# Patient Record
Sex: FEMALE | Race: WHITE | NOT HISPANIC OR LATINO | Employment: OTHER | URBAN - METROPOLITAN AREA
[De-identification: names, ages, dates, MRNs, and addresses within clinical notes are randomized per-mention and may not be internally consistent; named-entity substitution may affect disease eponyms.]

---

## 2017-12-31 ENCOUNTER — HOSPITAL ENCOUNTER (INPATIENT)
Facility: HOSPITAL | Age: 72
LOS: 5 days | Discharge: NURSING, REHAB OR LONG-TERM CARE | End: 2018-01-05
Attending: INTERNAL MEDICINE | Admitting: INTERNAL MEDICINE

## 2017-12-31 DIAGNOSIS — Z74.09 IMPAIRED FUNCTIONAL MOBILITY, BALANCE, GAIT, AND ENDURANCE: Primary | ICD-10-CM

## 2017-12-31 DIAGNOSIS — E87.6 HYPOKALEMIA: ICD-10-CM

## 2017-12-31 DIAGNOSIS — Z78.9 IMPAIRED MOBILITY AND ADLS: ICD-10-CM

## 2017-12-31 DIAGNOSIS — Z74.09 IMPAIRED MOBILITY AND ADLS: ICD-10-CM

## 2017-12-31 PROBLEM — J90 RECURRENT RIGHT PLEURAL EFFUSION: Status: ACTIVE | Noted: 2017-12-31

## 2017-12-31 PROBLEM — I10 HYPERTENSION: Status: ACTIVE | Noted: 2017-12-31

## 2017-12-31 PROBLEM — J90 RECURRENT LEFT PLEURAL EFFUSION: Status: ACTIVE | Noted: 2017-12-31

## 2017-12-31 PROBLEM — F10.11 HISTORY OF ALCOHOL ABUSE: Status: ACTIVE | Noted: 2017-12-31

## 2017-12-31 PROBLEM — E78.5 HYPERLIPIDEMIA: Status: ACTIVE | Noted: 2017-12-31

## 2017-12-31 PROBLEM — Z99.81 SUPPLEMENTAL OXYGEN DEPENDENT: Status: ACTIVE | Noted: 2017-12-31

## 2017-12-31 PROBLEM — Z91.199 MEDICAL NON-COMPLIANCE: Status: ACTIVE | Noted: 2017-12-31

## 2017-12-31 PROBLEM — F41.8 DEPRESSION WITH ANXIETY: Status: ACTIVE | Noted: 2017-12-31

## 2017-12-31 PROBLEM — R53.81 DEBILITY: Status: ACTIVE | Noted: 2017-12-31

## 2017-12-31 PROBLEM — J44.9 END STAGE COPD (HCC): Status: ACTIVE | Noted: 2017-12-31

## 2017-12-31 PROBLEM — I48.91 ATRIAL FIBRILLATION (HCC): Status: ACTIVE | Noted: 2017-12-31

## 2017-12-31 PROBLEM — J90 PLEURAL EFFUSION: Status: ACTIVE | Noted: 2017-12-31

## 2017-12-31 PROBLEM — Z87.891 HISTORY OF TOBACCO ABUSE: Status: ACTIVE | Noted: 2017-12-31

## 2017-12-31 LAB
ALBUMIN SERPL-MCNC: 2.6 G/DL (ref 3.2–4.8)
ALBUMIN/GLOB SERPL: 1.2 G/DL (ref 1.5–2.5)
ALP SERPL-CCNC: 85 U/L (ref 25–100)
ALT SERPL W P-5'-P-CCNC: 38 U/L (ref 7–40)
ANION GAP SERPL CALCULATED.3IONS-SCNC: 9 MMOL/L (ref 3–11)
AST SERPL-CCNC: 28 U/L (ref 0–33)
BASOPHILS # BLD AUTO: 0.01 10*3/MM3 (ref 0–0.2)
BASOPHILS NFR BLD AUTO: 0.1 % (ref 0–1)
BILIRUB SERPL-MCNC: 0.2 MG/DL (ref 0.3–1.2)
BUN BLD-MCNC: 15 MG/DL (ref 9–23)
BUN/CREAT SERPL: 21.4 (ref 7–25)
CALCIUM SPEC-SCNC: 7.9 MG/DL (ref 8.7–10.4)
CHLORIDE SERPL-SCNC: 96 MMOL/L (ref 99–109)
CO2 SERPL-SCNC: 30 MMOL/L (ref 20–31)
CREAT BLD-MCNC: 0.7 MG/DL (ref 0.6–1.3)
DEPRECATED RDW RBC AUTO: 48 FL (ref 37–54)
EOSINOPHIL # BLD AUTO: 0 10*3/MM3 (ref 0–0.3)
EOSINOPHIL NFR BLD AUTO: 0 % (ref 0–3)
ERYTHROCYTE [DISTWIDTH] IN BLOOD BY AUTOMATED COUNT: 13.8 % (ref 11.3–14.5)
GFR SERPL CREATININE-BSD FRML MDRD: 82 ML/MIN/1.73
GLOBULIN UR ELPH-MCNC: 2.1 GM/DL
GLUCOSE BLD-MCNC: 107 MG/DL (ref 70–100)
HCT VFR BLD AUTO: 30.1 % (ref 34.5–44)
HGB BLD-MCNC: 9.9 G/DL (ref 11.5–15.5)
IMM GRANULOCYTES # BLD: 0.12 10*3/MM3 (ref 0–0.03)
IMM GRANULOCYTES NFR BLD: 1.3 % (ref 0–0.6)
LYMPHOCYTES # BLD AUTO: 0.65 10*3/MM3 (ref 0.6–4.8)
LYMPHOCYTES NFR BLD AUTO: 6.8 % (ref 24–44)
MCH RBC QN AUTO: 31.3 PG (ref 27–31)
MCHC RBC AUTO-ENTMCNC: 32.9 G/DL (ref 32–36)
MCV RBC AUTO: 95.3 FL (ref 80–99)
MONOCYTES # BLD AUTO: 0.63 10*3/MM3 (ref 0–1)
MONOCYTES NFR BLD AUTO: 6.6 % (ref 0–12)
NEUTROPHILS # BLD AUTO: 8.11 10*3/MM3 (ref 1.5–8.3)
NEUTROPHILS NFR BLD AUTO: 85.2 % (ref 41–71)
PLATELET # BLD AUTO: 286 10*3/MM3 (ref 150–450)
PMV BLD AUTO: 8.9 FL (ref 6–12)
POTASSIUM BLD-SCNC: 4 MMOL/L (ref 3.5–5.5)
PROT SERPL-MCNC: 4.7 G/DL (ref 5.7–8.2)
RBC # BLD AUTO: 3.16 10*6/MM3 (ref 3.89–5.14)
SODIUM BLD-SCNC: 135 MMOL/L (ref 132–146)
WBC NRBC COR # BLD: 9.52 10*3/MM3 (ref 3.5–10.8)

## 2017-12-31 PROCEDURE — 85025 COMPLETE CBC W/AUTO DIFF WBC: CPT | Performed by: NURSE PRACTITIONER

## 2017-12-31 PROCEDURE — 80053 COMPREHEN METABOLIC PANEL: CPT | Performed by: NURSE PRACTITIONER

## 2017-12-31 PROCEDURE — 93005 ELECTROCARDIOGRAM TRACING: CPT | Performed by: NURSE PRACTITIONER

## 2017-12-31 PROCEDURE — 99223 1ST HOSP IP/OBS HIGH 75: CPT | Performed by: INTERNAL MEDICINE

## 2017-12-31 RX ORDER — GUAIFENESIN 600 MG/1
600 TABLET, EXTENDED RELEASE ORAL EVERY 12 HOURS SCHEDULED
Status: DISCONTINUED | OUTPATIENT
Start: 2018-01-01 | End: 2018-01-05 | Stop reason: HOSPADM

## 2017-12-31 RX ORDER — IPRATROPIUM BROMIDE AND ALBUTEROL SULFATE 2.5; .5 MG/3ML; MG/3ML
3 SOLUTION RESPIRATORY (INHALATION) EVERY 4 HOURS PRN
Status: DISCONTINUED | OUTPATIENT
Start: 2017-12-31 | End: 2018-01-05 | Stop reason: HOSPADM

## 2017-12-31 RX ORDER — PAROXETINE HYDROCHLORIDE 20 MG/1
20 TABLET, FILM COATED ORAL EVERY MORNING
COMMUNITY

## 2017-12-31 RX ORDER — THIAMINE MONONITRATE (VIT B1) 100 MG
100 TABLET ORAL DAILY
COMMUNITY

## 2017-12-31 RX ORDER — PANTOPRAZOLE SODIUM 40 MG/1
40 TABLET, DELAYED RELEASE ORAL 2 TIMES DAILY
COMMUNITY

## 2017-12-31 RX ORDER — PANTOPRAZOLE SODIUM 40 MG/1
40 TABLET, DELAYED RELEASE ORAL
Status: DISCONTINUED | OUTPATIENT
Start: 2018-01-01 | End: 2018-01-05 | Stop reason: HOSPADM

## 2017-12-31 RX ORDER — THIAMINE MONONITRATE (VIT B1) 100 MG
100 TABLET ORAL DAILY
Status: DISCONTINUED | OUTPATIENT
Start: 2018-01-01 | End: 2018-01-05 | Stop reason: HOSPADM

## 2017-12-31 RX ORDER — FOLIC ACID 1 MG/1
1 TABLET ORAL DAILY
COMMUNITY

## 2017-12-31 RX ORDER — DIGOXIN 125 MCG
125 TABLET ORAL
COMMUNITY

## 2017-12-31 RX ORDER — ALPRAZOLAM 0.25 MG/1
0.25 TABLET ORAL DAILY PRN
COMMUNITY
End: 2018-01-05 | Stop reason: HOSPADM

## 2017-12-31 RX ORDER — ASPIRIN 81 MG/1
81 TABLET, CHEWABLE ORAL DAILY
COMMUNITY

## 2017-12-31 RX ORDER — ACETAMINOPHEN 325 MG/1
650 TABLET ORAL EVERY 6 HOURS PRN
Status: DISCONTINUED | OUTPATIENT
Start: 2017-12-31 | End: 2018-01-05 | Stop reason: HOSPADM

## 2017-12-31 RX ORDER — AMIODARONE HYDROCHLORIDE 200 MG/1
200 TABLET ORAL 2 TIMES DAILY
Status: DISCONTINUED | OUTPATIENT
Start: 2017-12-31 | End: 2018-01-05 | Stop reason: HOSPADM

## 2017-12-31 RX ORDER — ASPIRIN 81 MG/1
81 TABLET, CHEWABLE ORAL DAILY
Status: DISCONTINUED | OUTPATIENT
Start: 2018-01-01 | End: 2018-01-05 | Stop reason: HOSPADM

## 2017-12-31 RX ORDER — GUAIFENESIN 600 MG/1
600 TABLET, EXTENDED RELEASE ORAL 2 TIMES DAILY
COMMUNITY

## 2017-12-31 RX ORDER — SODIUM CHLORIDE 0.9 % (FLUSH) 0.9 %
1-10 SYRINGE (ML) INJECTION AS NEEDED
Status: DISCONTINUED | OUTPATIENT
Start: 2017-12-31 | End: 2018-01-05 | Stop reason: HOSPADM

## 2017-12-31 RX ORDER — FOLIC ACID 1 MG/1
1 TABLET ORAL DAILY
Status: DISCONTINUED | OUTPATIENT
Start: 2018-01-01 | End: 2018-01-05 | Stop reason: HOSPADM

## 2017-12-31 RX ORDER — LISINOPRIL 2.5 MG/1
2.5 TABLET ORAL DAILY
Status: DISCONTINUED | OUTPATIENT
Start: 2018-01-01 | End: 2018-01-02

## 2017-12-31 RX ORDER — AMIODARONE HYDROCHLORIDE 200 MG/1
200 TABLET ORAL 2 TIMES DAILY
COMMUNITY

## 2017-12-31 RX ORDER — DIGOXIN 125 MCG
125 TABLET ORAL
Status: DISCONTINUED | OUTPATIENT
Start: 2018-01-02 | End: 2018-01-05 | Stop reason: HOSPADM

## 2017-12-31 RX ORDER — LISINOPRIL 2.5 MG/1
2.5 TABLET ORAL DAILY
COMMUNITY
End: 2018-01-05 | Stop reason: HOSPADM

## 2017-12-31 RX ORDER — ATORVASTATIN CALCIUM 20 MG/1
20 TABLET, FILM COATED ORAL NIGHTLY
COMMUNITY

## 2017-12-31 RX ORDER — AMLODIPINE BESYLATE 5 MG/1
5 TABLET ORAL DAILY
Status: DISCONTINUED | OUTPATIENT
Start: 2018-01-01 | End: 2018-01-02

## 2017-12-31 RX ORDER — AMLODIPINE BESYLATE 5 MG/1
5 TABLET ORAL DAILY
COMMUNITY
End: 2018-01-05 | Stop reason: HOSPADM

## 2017-12-31 RX ORDER — ATORVASTATIN CALCIUM 20 MG/1
20 TABLET, FILM COATED ORAL NIGHTLY
Status: DISCONTINUED | OUTPATIENT
Start: 2018-01-01 | End: 2018-01-05 | Stop reason: HOSPADM

## 2017-12-31 RX ORDER — PAROXETINE HYDROCHLORIDE 20 MG/1
20 TABLET, FILM COATED ORAL EVERY MORNING
Status: DISCONTINUED | OUTPATIENT
Start: 2018-01-01 | End: 2018-01-05 | Stop reason: HOSPADM

## 2017-12-31 RX ORDER — SIMVASTATIN 20 MG
20 TABLET ORAL DAILY
COMMUNITY
End: 2018-01-05 | Stop reason: HOSPADM

## 2017-12-31 RX ADMIN — Medication 5 MG: at 23:33

## 2017-12-31 RX ADMIN — AMIODARONE HYDROCHLORIDE 200 MG: 200 TABLET ORAL at 23:33

## 2017-12-31 RX ADMIN — METOPROLOL TARTRATE 25 MG: 25 TABLET ORAL at 23:33

## 2018-01-01 ENCOUNTER — APPOINTMENT (OUTPATIENT)
Dept: CARDIOLOGY | Facility: HOSPITAL | Age: 73
End: 2018-01-01

## 2018-01-01 ENCOUNTER — APPOINTMENT (OUTPATIENT)
Dept: GENERAL RADIOLOGY | Facility: HOSPITAL | Age: 73
End: 2018-01-01

## 2018-01-01 LAB
ANION GAP SERPL CALCULATED.3IONS-SCNC: 8 MMOL/L (ref 3–11)
BASOPHILS # BLD AUTO: 0 10*3/MM3 (ref 0–0.2)
BASOPHILS NFR BLD AUTO: 0 % (ref 0–1)
BUN BLD-MCNC: 14 MG/DL (ref 9–23)
BUN/CREAT SERPL: 20 (ref 7–25)
CALCIUM SPEC-SCNC: 8.1 MG/DL (ref 8.7–10.4)
CHLORIDE SERPL-SCNC: 95 MMOL/L (ref 99–109)
CO2 SERPL-SCNC: 32 MMOL/L (ref 20–31)
CREAT BLD-MCNC: 0.7 MG/DL (ref 0.6–1.3)
DEPRECATED RDW RBC AUTO: 48.7 FL (ref 37–54)
DIGOXIN SERPL-MCNC: 0.03 NG/ML (ref 0.8–2)
EOSINOPHIL # BLD AUTO: 0.01 10*3/MM3 (ref 0–0.3)
EOSINOPHIL NFR BLD AUTO: 0.1 % (ref 0–3)
ERYTHROCYTE [DISTWIDTH] IN BLOOD BY AUTOMATED COUNT: 14.2 % (ref 11.3–14.5)
GFR SERPL CREATININE-BSD FRML MDRD: 82 ML/MIN/1.73
GLUCOSE BLD-MCNC: 79 MG/DL (ref 70–100)
HCT VFR BLD AUTO: 29.6 % (ref 34.5–44)
HGB BLD-MCNC: 9.8 G/DL (ref 11.5–15.5)
IMM GRANULOCYTES # BLD: 0.09 10*3/MM3 (ref 0–0.03)
IMM GRANULOCYTES NFR BLD: 1.1 % (ref 0–0.6)
INR PPP: 0.94
LYMPHOCYTES # BLD AUTO: 1.03 10*3/MM3 (ref 0.6–4.8)
LYMPHOCYTES NFR BLD AUTO: 12.1 % (ref 24–44)
MCH RBC QN AUTO: 31.4 PG (ref 27–31)
MCHC RBC AUTO-ENTMCNC: 33.1 G/DL (ref 32–36)
MCV RBC AUTO: 94.9 FL (ref 80–99)
MONOCYTES # BLD AUTO: 0.68 10*3/MM3 (ref 0–1)
MONOCYTES NFR BLD AUTO: 8 % (ref 0–12)
NEUTROPHILS # BLD AUTO: 6.69 10*3/MM3 (ref 1.5–8.3)
NEUTROPHILS NFR BLD AUTO: 78.7 % (ref 41–71)
PLATELET # BLD AUTO: 294 10*3/MM3 (ref 150–450)
PMV BLD AUTO: 8.8 FL (ref 6–12)
POTASSIUM BLD-SCNC: 4.4 MMOL/L (ref 3.5–5.5)
PROTHROMBIN TIME: 10.2 SECONDS (ref 9.6–11.5)
RBC # BLD AUTO: 3.12 10*6/MM3 (ref 3.89–5.14)
SODIUM BLD-SCNC: 135 MMOL/L (ref 132–146)
TSH SERPL DL<=0.05 MIU/L-ACNC: 0.77 MIU/ML (ref 0.35–5.35)
WBC NRBC COR # BLD: 8.5 10*3/MM3 (ref 3.5–10.8)

## 2018-01-01 PROCEDURE — 85025 COMPLETE CBC W/AUTO DIFF WBC: CPT | Performed by: NURSE PRACTITIONER

## 2018-01-01 PROCEDURE — 99232 SBSQ HOSP IP/OBS MODERATE 35: CPT | Performed by: HOSPITALIST

## 2018-01-01 PROCEDURE — 93306 TTE W/DOPPLER COMPLETE: CPT

## 2018-01-01 PROCEDURE — 85610 PROTHROMBIN TIME: CPT | Performed by: NURSE PRACTITIONER

## 2018-01-01 PROCEDURE — 80162 ASSAY OF DIGOXIN TOTAL: CPT | Performed by: NURSE PRACTITIONER

## 2018-01-01 PROCEDURE — 93306 TTE W/DOPPLER COMPLETE: CPT | Performed by: INTERNAL MEDICINE

## 2018-01-01 PROCEDURE — 71046 X-RAY EXAM CHEST 2 VIEWS: CPT

## 2018-01-01 PROCEDURE — 80048 BASIC METABOLIC PNL TOTAL CA: CPT | Performed by: NURSE PRACTITIONER

## 2018-01-01 PROCEDURE — 93010 ELECTROCARDIOGRAM REPORT: CPT | Performed by: INTERNAL MEDICINE

## 2018-01-01 PROCEDURE — 84443 ASSAY THYROID STIM HORMONE: CPT | Performed by: NURSE PRACTITIONER

## 2018-01-01 RX ADMIN — PAROXETINE HYDROCHLORIDE 20 MG: 20 TABLET, FILM COATED ORAL at 08:55

## 2018-01-01 RX ADMIN — PANTOPRAZOLE SODIUM 40 MG: 40 TABLET, DELAYED RELEASE ORAL at 08:55

## 2018-01-01 RX ADMIN — LISINOPRIL 2.5 MG: 2.5 TABLET ORAL at 08:55

## 2018-01-01 RX ADMIN — AMIODARONE HYDROCHLORIDE 200 MG: 200 TABLET ORAL at 08:55

## 2018-01-01 RX ADMIN — METOPROLOL TARTRATE 25 MG: 25 TABLET ORAL at 08:55

## 2018-01-01 RX ADMIN — GUAIFENESIN 600 MG: 600 TABLET, EXTENDED RELEASE ORAL at 08:56

## 2018-01-01 RX ADMIN — ASPIRIN 81 MG 81 MG: 81 TABLET ORAL at 08:55

## 2018-01-01 RX ADMIN — AMLODIPINE BESYLATE 5 MG: 5 TABLET ORAL at 08:55

## 2018-01-01 RX ADMIN — FOLIC ACID 1 MG: 1 TABLET ORAL at 08:55

## 2018-01-01 RX ADMIN — AMIODARONE HYDROCHLORIDE 200 MG: 200 TABLET ORAL at 21:14

## 2018-01-01 RX ADMIN — GUAIFENESIN 600 MG: 600 TABLET, EXTENDED RELEASE ORAL at 21:13

## 2018-01-01 RX ADMIN — ATORVASTATIN CALCIUM 20 MG: 20 TABLET, FILM COATED ORAL at 21:13

## 2018-01-01 RX ADMIN — Medication 5 MG: at 21:14

## 2018-01-01 RX ADMIN — PANTOPRAZOLE SODIUM 40 MG: 40 TABLET, DELAYED RELEASE ORAL at 17:30

## 2018-01-01 RX ADMIN — Medication 100 MG: at 08:55

## 2018-01-01 NOTE — CONSULTS
"Adult Nutrition  Assessment/PES    Patient Name:  Margarita Davila  YOB: 1945  MRN: [id number]  Admit Date:  12/31/2017    Assessment Date:  1/1/2018            Reason for Assessment       01/01/18 1619    Reason for Assessment    Reason For Assessment/Visit physician consult   Pt does not meet nutrition risk screen criteria based on reported weight and intake status. Will follow per protocol.     Identified At Risk By Screening Criteria MST SCORE 2+    Time Spent (min) 20              Nutrition/Diet History       01/01/18 1620    Nutrition/Diet History    Reported/Observed By Patient    Appetite Improved    Other Pt reported UBW to be around 140-145 lbs, reported appetite has been improving and is currently better than it has been, reported no more N/V/D.            Anthropometrics       01/01/18 1621    Anthropometrics    Height 170.2 cm (67\")    Weight 63.8 kg (140 lb 11.2 oz)    Ideal Body Weight (IBW)    Ideal Body Weight (IBW), Female 62.26    % Ideal Body Weight 102.72    Usual Body Weight (UBW)    Usual Body Weight --   140-145 lbs     Body Mass Index (BMI)    BMI (kg/m2) 22.08        Problem/Interventions:          Nutrition Intervention       01/01/18 1621    Nutrition Intervention    RD/Tech Action Menu provided;Encourage intake;Follow Tx progress;Care plan reviewd              Education/Evaluation       01/01/18 1621    Monitor/Evaluation    Monitor Per protocol        Electronically signed by:  Lor Neff  01/01/18 4:22 PM  "

## 2018-01-01 NOTE — H&P
[facility] Medicine Services  HISTORY AND PHYSICAL    Patient Name: Margarita Davila  : 1945  MRN: [id number]  Primary Care Physician: ADDIE Ye    Subjective   Subjective     Chief Complaint:  SOA, poor appetite     HPI:  Margarita Davila is a 72 y.o. female with PMH significant for HTN, HLD, end stage COPD, a-fib, tobacco abuse, and ETOH abuse, That originally presented to [facility] on 2017 with complaint of SOA. She reportedly was in [facility] x 2 over the past month with the most recent admission being 3 weeks ago due to medical noncompliance with her aifb and increased debility. It was felt that she would benefit from inpt rehab, but her insure denied payment, so she went home with her daughter. The daughter notes that after discharge, she was doing poorly with decreased appetite and weakness as well as progressively short of breath prompting her presentation to the [facility] ED.   Upon arrival to the OSH, CXR revealed complete opacification of the right hemithorax with possible combination of effusion and atelectasis as well as abrupt cutoff of the right mainstem bronchus with suspicion of centrally obstructing mass. CT chest PE protocol was negative for PE, and confirmed large pleural effusion with complete atelectaic collapse of the right lung with obstruction of the right mainstem bronchus from possible mucus plugging, or endobronchial neoplasm  She then underwent CT guided thoracentesis on 17 with successful drainage of  1500 ml of fluid with negative pathology report. She then began to have SOA again, and repeat of the CXR revealed recurrent Right pleural effusion.  Decision was made to transfer to [facility] due to inability of the OSH for repeat thoracentesis, and Pulmonolgy consult.    Patient currently appears comfortable and in no acute distress.  Patient is a very poor historian but states she had been feeling fine other than  having a poor appetite prior to going to [facility].  No family is present.  Patient will be admitted to [facility] under the care of the Hospitalist for further evaluation and treatment.      Review of Systems   Constitutional: Positive for appetite change and fatigue. Negative for activity change, chills, diaphoresis, fever and unexpected weight change.   HENT: Negative.    Eyes: Negative.    Respiratory: Positive for cough and shortness of breath. Negative for wheezing.    Cardiovascular: Negative for chest pain, palpitations and leg swelling.   Gastrointestinal: Positive for nausea and vomiting. Negative for abdominal distention, abdominal pain, blood in stool, constipation and diarrhea.   Endocrine: Negative.    Genitourinary: Negative for difficulty urinating, dysuria, flank pain, frequency and hematuria.   Musculoskeletal: Negative.    Skin: Negative.    Allergic/Immunologic: Negative.    Neurological: Negative for dizziness, facial asymmetry, speech difficulty, weakness, light-headedness and headaches.   Hematological: Negative.    Psychiatric/Behavioral: Negative.           Otherwise 10-system ROS reviewed and is negative except as mentioned in the HPI.    Personal History     Past Medical History:   Diagnosis Date   • Anxiety    • COPD (chronic obstructive pulmonary disease)    • Depression    • Hypertension    • Stroke        History reviewed. No pertinent surgical history.    Family History: family history includes Colon cancer in her mother; Tuberculosis in her father.     Social History:  reports that she has quit smoking. Her smoking use included Cigarettes. She smoked 2.00 packs per day. She has never used smokeless tobacco. She reports that she does not drink alcohol or use illicit drugs.  Social History     Social History Narrative    Currently resides in [facility] with daughter        Medications:  Prescriptions Prior to Admission   Medication Sig Dispense Refill Last Dose   • ALPRAZolam (XANAX)  0.25 MG tablet Take 0.25 mg by mouth Daily As Needed for Anxiety.      • amiodarone (PACERONE) 200 MG tablet Take 200 mg by mouth 2 (Two) Times a Day.      • amLODIPine (NORVASC) 5 MG tablet Take 5 mg by mouth Daily.      • aspirin 81 MG chewable tablet Chew 81 mg Daily.      • atorvastatin (LIPITOR) 20 MG tablet Take 20 mg by mouth Every Night.      • Caffeine-Magnesium Salicylate (DIUREX) .5 MG tablet Take  by mouth.      • digoxin (LANOXIN) 125 MCG tablet Take 125 mcg by mouth 4 (Four) Times a Week. Saturday, Monday, Wednesday, Thursday      • folic acid (FOLVITE) 1 MG tablet Take 1 mg by mouth Daily.      • guaiFENesin (MUCINEX) 600 MG 12 hr tablet Take 600 mg by mouth 2 (Two) Times a Day.      • lisinopril (PRINIVIL,ZESTRIL) 2.5 MG tablet Take 2.5 mg by mouth Daily.      • melatonin 5 MG sublingual tablet sublingual tablet Place  under the tongue At Night As Needed.      • metoprolol tartrate (LOPRESSOR) 25 MG tablet Take 25 mg by mouth 2 (Two) Times a Day.      • pantoprazole (PROTONIX) 40 MG EC tablet Take 40 mg by mouth 2 (Two) Times a Day.      • PARoxetine (PAXIL) 20 MG tablet Take 20 mg by mouth Every Morning.      • simvastatin (ZOCOR) 20 MG tablet Take 20 mg by mouth Daily.      • thiamine (VITAMIN B-1) 100 MG tablet Take 100 mg by mouth Daily.          Allergies   Allergen Reactions   • Penicillins      Numbness in nose       Objective   Objective     Vital Signs:   Temp:  [97.5 °F (36.4 °C)] 97.5 °F (36.4 °C)  Heart Rate:  [85-86] 85  Resp:  [18] 18  BP: (126-127)/(84-92) 126/84        Physical Exam   Constitutional: She is oriented to person, place, and time. She appears well-developed and well-nourished. No distress.   HENT:   Head: Normocephalic and atraumatic.   Eyes: Conjunctivae and EOM are normal. Pupils are equal, round, and reactive to light. Right eye exhibits no discharge. Left eye exhibits no discharge. No scleral icterus.   Neck: Normal range of motion. Neck supple. No JVD present.  No tracheal deviation present. No thyromegaly present.   Cardiovascular: Normal rate, regular rhythm and intact distal pulses.  Exam reveals no gallop and no friction rub.    No murmur heard.  Pulmonary/Chest: Effort normal. No stridor. She has rales.   Crackles heard throughout right lung and left lower lobe.    Abdominal: Soft. Bowel sounds are normal. She exhibits no distension and no mass. There is no tenderness. There is no rebound and no guarding. No hernia.   Musculoskeletal: Normal range of motion. She exhibits no edema, tenderness or deformity.   Lymphadenopathy:     She has no cervical adenopathy.   Neurological: She is alert and oriented to person, place, and time. She has normal reflexes.   Skin: Skin is warm and dry. No rash noted. She is not diaphoretic. No erythema. No pallor.   Scattered ecchymosis on bilateral upper extremities.     Psychiatric: She has a normal mood and affect. Her behavior is normal. Judgment and thought content normal.   Nursing note and vitals reviewed.     Results Reviewed:  I have personally reviewed current lab, radiology, and data and agree.      Results from last 7 days  Lab Units 12/31/17  2225   WBC 10*3/mm3 9.52   HEMOGLOBIN g/dL 9.9*   HEMATOCRIT % 30.1*   PLATELETS 10*3/mm3 286       Results from last 7 days  Lab Units 12/31/17  2225   SODIUM mmol/L 135   POTASSIUM mmol/L 4.0   CHLORIDE mmol/L 96*   CO2 mmol/L 30.0   BUN mg/dL 15   CREATININE mg/dL 0.70   GLUCOSE mg/dL 107*   CALCIUM mg/dL 7.9*   ALT (SGPT) U/L 38   AST (SGOT) U/L 28     Brief Urine Lab Results     None        No results found for: BNP  No results found for: PHART  Imaging Results (last 24 hours)     ** No results found for the last 24 hours. **             Assessment/Plan   Assessment / Plan     Hospital Problem List     * (Principal)Recurrent right pleural effusion    Atrial fibrillation    Medical non-compliance    History of tobacco abuse    History of alcohol abuse    End stage COPD     Supplemental oxygen dependent    Recurrent left pleural effusion    Debility    Hyperlipidemia    Depression with anxiety    Hypertension    Pleural effusion            Assessment & Plan:  72 year old female with end stage COPD presents from [facility] for recurrent pleural effusion s/p right thoracentesis (1.5L drained)    1) Recurrent right pleural effusion  -etiology unclear  -? Underlying CHF vs liver disease  -check PT/INR  -labs pending  -check echo in am  -repeat CXR now, last CXR performed in 12-  -NPO after midnight for possible intervention in am  -continuous pulse ox, keep o2 saturation >90%  -currently on 3L NC and o2 saturation 100%  -s/p right thoracentesis on 12- (1.5 ml transudative fluid noted) no malignant cells noted on pathology report    2) End stage COPD  -currently wears 2L nasal cannula continuously at home  -? Compliant with medications     -continuous pulse ox  -keep o2 ? >90%  -patient unsure if she is being followed by pulmonologist in past  -prn duo-nebs    3) Essential hypertension  -continue home medications with holding parameters    4) Depression with anxiety  -continue home medications    5) Chronic atrial fibrillation   -[id number]-METt score: 5  -not on anti-coagulation due to non-compliance with medications  -continue home amiodarone and digoxin  -check dig level  -echo in am, no echo in records  -previously followed by Dr. Tatum   -EKG now  -check TSH    6) Hyperlipidemia  -continue statin      7) History of tobacco abuse  -quit approximately 3-4 years ago    8) Remote history of ETOH abuse  -per patient denies, but in reports confirms.  Daughter reports no recent alcohol use.         DVT prophylaxis:teds/scds defer anticoagulation due to possible intervention in am    CODE STATUS:  Full code   Admission Status:  I believe this patient meets INPATIENT status due to the need for care which can only be reasonably provided in an hospital setting such as  aggressive/expedited ancillary services and/or consultation services, the necessity for IV medications, close physician monitoring and/or the possible need for procedures.  In such, I feel patient’s risk for adverse outcomes and need for care warrant INPATIENT evaluation and predict the patient’s care encounter to likely last beyond 2 midnights.      Kathleen Cedeno, LULU   12/31/17   11:15 PM      Brief Attending Admission Attestation     I have seen and examined the patient, performing an independent face-to-face diagnostic evaluation with plan of care reviewed and developed with the advanced practice clinician (APC).      Brief Summary Statement/HPI:   Margarita Davila is a 72 y.o. female with COPD on 2L O2 at baseline, A-fib presenting in transfer from [facility] due to recurrent right pleural effusion.  She was initially admitted there for what she reports is vomiting and poor PO intake after two recent hospitalizations at [place] as well as weakness.  N/V has since resolved.  She was noted to have large right pleural effusion as well as concern for right bronchus obstruction.  She had a right thoracentesis revealing transudative fluid.  She also had a bronchoscopy without malignant cells noted on pathology.  Pleural fluid cultures have no growth and sputum shows mixed respiratory chon.  There is no bronchoscopy report in the documents sent with her.  Due to recurrence of pleural effusion and lack of pulmonologist for the next week with no one else to do thoracentesis, as well as report of increasing O2 requirement with recurrent pleural effusion, transfer to [facility] was requested.    Attending Physical Exam:  Constitutional: No acute distress, awake, alert, sitting up in bed drinking coffee  Eyes: PERRLA, sclerae anicteric, no conjunctival injection  HENT: NCAT, mucous membranes moist  Neck: Supple, trachea midline  Respiratory: Clear to auscultation bilaterally, nonlabored respirations    Cardiovascular: Irregularly irregular, rate controlled, no murmurs, rubs, or gallops  Gastrointestinal: Positive bowel sounds, soft, nontender, nondistended  Musculoskeletal: No bilateral ankle edema, no clubbing or cyanosis to extremities  Psychiatric: Appropriate affect, cooperative  Neurologic:Cranial Nerves grossly intact to confrontation, speech clear  Skin: No rashes      Brief Assessment/Plan :  See above for further detailed assessment and plan developed with [facility] which I have reviewed and/or edited.    -She is currently stable on her baseline O2  -Obtain CXR to evaluate pleural effusion  -Request bronchoscopy report from [facility] because although pathology from washings shows no malignant cells, I am not sure what they actually saw on bronchoscopy and there was concern for endobronchial lesion noted on CT chest  -Further workup for the etiology of her transudative bilateral pleural effusion with echocardiogram.  May consider abdominal ultrasound to evaluate for liver disease if echocardiogram is unrevealing although she did not appear to have significant ascites on exam.  She does have a heavy drinking history per outside documentation.  -PT/OT    I believe this patient meets INPATIENT status due to the need for care which can only be reasonably provided in an hospital setting such as aggressive/expedited ancillary services and/or consultation services, the necessity for IV medications, close physician monitoring and/or the possible need for procedures.  In such, I feel patient’s risk for adverse outcomes and need for care warrant INPATIENT evaluation and predict the patient’s care encounter to likely last beyond 2 midnights.    Nette Vo MD  01/01/18  1:30 AM

## 2018-01-01 NOTE — PROGRESS NOTES
[facility] Medicine Services  PROGRESS NOTE    Patient Name: Margarita Davila  : 1945  MRN: [id number]    Date of Admission: 2017  Length of Stay: 1  Primary Care Physician: ADDIE Ye    Subjective   Subjective     CC:  F/u pleff    HPI:  Patient resting in bed when seen this morning.  She denies significant dyspnea and reports only minimal cough during my visit.  No fevers or chills.    Review of Systems  Gen- No fevers, chills  CV- No chest pain, palpitations  Resp- As above  GI- No N/V/D, abd pain    Otherwise ROS is negative except as mentioned in the HPI.    Objective   Objective     Vital Signs:   Temp:  [97.5 °F (36.4 °C)-98.1 °F (36.7 °C)] 98.1 °F (36.7 °C)  Heart Rate:  [73-92] 73  Resp:  [16-18] 16  BP: (117-130)/(74-92) 117/74     Physical Exam:  Constitutional: No acute distress, awake, alert  HENT: NCAT, mucous membranes moist  Respiratory: Decreased breath sounds right greater than left base, respiratory effort normal   Cardiovascular: RRR, no murmurs, rubs, or gallops, palpable pedal pulses bilaterally  Gastrointestinal: Positive bowel sounds, soft, nontender, nondistended  Musculoskeletal: No bilateral ankle edema  Psychiatric: Appropriate affect, cooperative  Neurologic: Oriented x 3, strength symmetric in all extremities, Cranial Nerves grossly intact to confrontation, speech clear  Skin: No rashes    Results Reviewed:  I have personally reviewed current lab, radiology, and data and agree.      Results from last 7 days  Lab Units 18  0548 17  2225   WBC 10*3/mm3 8.50 9.52   HEMOGLOBIN g/dL 9.8* 9.9*   HEMATOCRIT % 29.6* 30.1*   PLATELETS 10*3/mm3 294 286   INR  0.94  --        Results from last 7 days  Lab Units 18  0548 17  2225   SODIUM mmol/L 135 135   POTASSIUM mmol/L 4.4 4.0   CHLORIDE mmol/L 95* 96*   CO2 mmol/L 32.0* 30.0   BUN mg/dL 14 15   CREATININE mg/dL 0.70 0.70   GLUCOSE mg/dL 79 107*   CALCIUM mg/dL 8.1* 7.9*    ALT (SGPT) U/L  --  38   AST (SGOT) U/L  --  28     No results found for: BNP  No results found for: PHART    Microbiology Results Abnormal     None        Imaging Results (last 24 hours)     Procedure Component Value Units Date/Time    XR Chest PA & Lateral [[id number]] Collected:  12/31/17 2330     Updated:  01/01/18 0200    Narrative:       EXAM:    XR Chest, 2 Views    CLINICAL HISTORY:    72 years old, female; Signs and symptoms; Other: Pleural effusion    TECHNIQUE:    Frontal and lateral views of the chest.    COMPARISON:    No relevant prior studies available.    FINDINGS:    Lungs:  Hyperinflated.  Moderate right and small-to-moderate left pleural   effusions with underlying pulmonary opacities.    Pleural space:  See above.    Heart:  Unremarkable.    Mediastinum:  Unremarkable.    Bones/joints:  Unremarkable.      Impression:         Moderate right and small-to-moderate left pleural effusions with underlying   atelectasis plus or minus infiltrate.    THIS DOCUMENT HAS BEEN ELECTRONICALLY SIGNED BY HECTOR STACY MD             I have reviewed the medications.    Assessment/Plan   Assessment / Plan     Hospital Problem List     * (Principal)Recurrent right pleural effusion    Atrial fibrillation    Medical non-compliance    History of tobacco abuse    History of alcohol abuse    End stage COPD    Supplemental oxygen dependent    Recurrent left pleural effusion    Debility    Hyperlipidemia    Depression with anxiety    Hypertension    Pleural effusion           Brief Hospital Course to date:  Margarita Davila is a 72 y.o. female with a past medical history of hypertension, hyperlipidemia, end-stage COPD, atrial fibrillation, tobacco abuse, and alcohol abuse who was transferred to our facility on 12/31 from [facility] where she initially was admitted on 12/19.  Outside evaluation there disclosed complete opacification of the right hemithorax with evidence of a large pleural effusion on CT scan.   CT thoracentesis on 12/21 drained 1.5 L fluid and pt had a bronchoscopy with negative path per report (data deficient on presence of endobronchial lesion). Pt transferred here for higher level of care:    Assessment & Plan:    Recurrent right pleural effusion, transudative status post thoracentesis of 1.5 L on 12/21  Small to moderate left pleural effusion  - Need outside bronchoscopy report   - Follow up Echo ordered here    - Repeat CXR here with small to moderate right pleural effusion and small on left  - NPO after MN. Depending on results of echo, will consider diuresis or repeat thoracentesis and Pulm consult to occur tomorrow. At present, pt is comfortable and not in respiratory distress   - Hypoalbuminemia noted on CMP, suspect nutritional component, Dietician consult ordered, check liver US to rule out chronic liver disease as contributing      End-stage COPD, per report  Chronic nocturnal hypoxia, 2 LNC 2 at bedtime at home  - Supplemental O2, prn nebs, mucinex    Chronic primary hypertension  - BP normal on home meds     Weakness/debility  - PT/OT    Chronic Atrial Fibrillation, ChadsVasc 5  - Continue metoprolol   - Not chronically anticoagulated due to medication noncompliance per report  - F/u echo    Medical Noncompliance     Normocytic Anemia     Hyperlipidemia    Hx of Tobacco Abuse     Remote Alcohol Use     DVT Prophylaxis: mechanical given possible need for procedure   CODE STATUS: Full Code  Disposition: I expect the patient to be discharged home (?) in ~2 days depending on evaluation of pleff.    Musa Carson MD  01/01/18  7:54 AM

## 2018-01-01 NOTE — PLAN OF CARE
Problem: Patient Care Overview (Adult)  Goal: Plan of Care Review  Outcome: Ongoing (interventions implemented as appropriate)   01/01/18 0236   Coping/Psychosocial Response Interventions   Plan Of Care Reviewed With patient   Patient Care Overview   Progress progress toward functional goals as expected   Outcome Evaluation   Outcome Summary/Follow up Plan no acute overnight events       Problem: Fall Risk (Adult)  Goal: Identify Related Risk Factors and Signs and Symptoms  Outcome: Ongoing (interventions implemented as appropriate)   01/01/18 0236   Fall Risk   Fall Risk: Related Risk Factors fatigue/slow reaction;gait/mobility problems;environment unfamiliar   Fall Risk: Signs and Symptoms presence of risk factors       Problem: Respiratory Insufficiency (Adult)  Goal: Identify Related Risk Factors and Signs and Symptoms  Outcome: Ongoing (interventions implemented as appropriate)   01/01/18 0236   Respiratory Insufficiency   Related Risk Factors (Respiratory Insufficiency) activity intolerance;motivation lacking   Signs and Symptoms (Respiratory Insufficiency) cough (productive/ineffective);decreased oxygen saturation;shortness of breath

## 2018-01-01 NOTE — PLAN OF CARE
Problem: Fall Risk (Adult)  Goal: Identify Related Risk Factors and Signs and Symptoms  Outcome: Outcome(s) achieved Date Met: 01/01/18    Goal: Absence of Falls  Outcome: Ongoing (interventions implemented as appropriate)      Problem: Respiratory Insufficiency (Adult)  Goal: Identify Related Risk Factors and Signs and Symptoms  Outcome: Outcome(s) achieved Date Met: 01/01/18    Goal: Acid/Base Balance  Outcome: Ongoing (interventions implemented as appropriate)    Goal: Effective Ventilation  Outcome: Ongoing (interventions implemented as appropriate)

## 2018-01-02 ENCOUNTER — APPOINTMENT (OUTPATIENT)
Dept: ULTRASOUND IMAGING | Facility: HOSPITAL | Age: 73
End: 2018-01-02

## 2018-01-02 LAB
ALBUMIN SERPL-MCNC: 2.6 G/DL (ref 3.2–4.8)
ALBUMIN/GLOB SERPL: 1.2 G/DL (ref 1.5–2.5)
ALP SERPL-CCNC: 69 U/L (ref 25–100)
ALT SERPL W P-5'-P-CCNC: 43 U/L (ref 7–40)
ANION GAP SERPL CALCULATED.3IONS-SCNC: 4 MMOL/L (ref 3–11)
AST SERPL-CCNC: 41 U/L (ref 0–33)
BH CV ECHO MEAS - AO MAX PG (FULL): 5 MMHG
BH CV ECHO MEAS - AO MAX PG: 7 MMHG
BH CV ECHO MEAS - AO MEAN PG (FULL): 2.7 MMHG
BH CV ECHO MEAS - AO MEAN PG: 3.8 MMHG
BH CV ECHO MEAS - AO ROOT AREA (BSA CORRECTED): 1.7
BH CV ECHO MEAS - AO ROOT AREA: 7 CM^2
BH CV ECHO MEAS - AO ROOT DIAM: 3 CM
BH CV ECHO MEAS - AO V2 MAX: 133.8 CM/SEC
BH CV ECHO MEAS - AO V2 MEAN: 88.8 CM/SEC
BH CV ECHO MEAS - AO V2 VTI: 23.7 CM
BH CV ECHO MEAS - ASC AORTA: 3.1 CM
BH CV ECHO MEAS - AVA(I,A): 2.4 CM^2
BH CV ECHO MEAS - AVA(I,D): 2.4 CM^2
BH CV ECHO MEAS - AVA(V,A): 1.7 CM^2
BH CV ECHO MEAS - AVA(V,D): 1.7 CM^2
BH CV ECHO MEAS - BSA(HAYCOCK): 1.7 M^2
BH CV ECHO MEAS - BSA: 1.7 M^2
BH CV ECHO MEAS - BZI_BMI: 21.9 KILOGRAMS/M^2
BH CV ECHO MEAS - BZI_METRIC_HEIGHT: 170.2 CM
BH CV ECHO MEAS - BZI_METRIC_WEIGHT: 63.5 KG
BH CV ECHO MEAS - EDV(CUBED): 136.9 ML
BH CV ECHO MEAS - EDV(TEICH): 126.9 ML
BH CV ECHO MEAS - EF(CUBED): 71.6 %
BH CV ECHO MEAS - EF(TEICH): 63 %
BH CV ECHO MEAS - ESV(CUBED): 38.8 ML
BH CV ECHO MEAS - ESV(TEICH): 47 ML
BH CV ECHO MEAS - FS: 34.3 %
BH CV ECHO MEAS - IVS/LVPW: 0.8
BH CV ECHO MEAS - IVSD: 0.75 CM
BH CV ECHO MEAS - LA DIMENSION: 4 CM
BH CV ECHO MEAS - LA/AO: 1.3
BH CV ECHO MEAS - LV MASS(C)D: 154.1 GRAMS
BH CV ECHO MEAS - LV MASS(C)DI: 88.7 GRAMS/M^2
BH CV ECHO MEAS - LV MAX PG: 2 MMHG
BH CV ECHO MEAS - LV MEAN PG: 1.1 MMHG
BH CV ECHO MEAS - LV V1 MAX: 70.1 CM/SEC
BH CV ECHO MEAS - LV V1 MEAN: 47.7 CM/SEC
BH CV ECHO MEAS - LV V1 VTI: 17.6 CM
BH CV ECHO MEAS - LVIDD: 5.2 CM
BH CV ECHO MEAS - LVIDS: 3.4 CM
BH CV ECHO MEAS - LVOT AREA (M): 3.1 CM^2
BH CV ECHO MEAS - LVOT AREA: 3.2 CM^2
BH CV ECHO MEAS - LVOT DIAM: 2 CM
BH CV ECHO MEAS - LVPWD: 0.94 CM
BH CV ECHO MEAS - MV E MAX VEL: 80.5 CM/SEC
BH CV ECHO MEAS - PA ACC SLOPE: 741.9 CM/SEC^2
BH CV ECHO MEAS - PA ACC TIME: 0.09 SEC
BH CV ECHO MEAS - PA MAX PG: 4.1 MMHG
BH CV ECHO MEAS - PA PR(ACCEL): 37.8 MMHG
BH CV ECHO MEAS - PA V2 MAX: 101.7 CM/SEC
BH CV ECHO MEAS - RAP SYSTOLE: 8 MMHG
BH CV ECHO MEAS - RVDD: 2.4 CM
BH CV ECHO MEAS - RVSP: 45 MMHG
BH CV ECHO MEAS - SI(AO): 95.7 ML/M^2
BH CV ECHO MEAS - SI(CUBED): 56.4 ML/M^2
BH CV ECHO MEAS - SI(LVOT): 32.2 ML/M^2
BH CV ECHO MEAS - SI(TEICH): 46 ML/M^2
BH CV ECHO MEAS - SV(AO): 166.4 ML
BH CV ECHO MEAS - SV(CUBED): 98.1 ML
BH CV ECHO MEAS - SV(LVOT): 56 ML
BH CV ECHO MEAS - SV(TEICH): 79.9 ML
BH CV ECHO MEAS - TR MAX VEL: 289.1 CM/SEC
BH CV XLRA - RV BASE: 3.8 CM
BH CV XLRA - RV LENGTH: 5.3 CM
BH CV XLRA - RV MID: 2.3 CM
BILIRUB SERPL-MCNC: 0.5 MG/DL (ref 0.3–1.2)
BNP SERPL-MCNC: 291 PG/ML (ref 0–100)
BUN BLD-MCNC: 9 MG/DL (ref 9–23)
BUN/CREAT SERPL: 12.9 (ref 7–25)
CALCIUM SPEC-SCNC: 8.2 MG/DL (ref 8.7–10.4)
CHLORIDE SERPL-SCNC: 98 MMOL/L (ref 99–109)
CO2 SERPL-SCNC: 35 MMOL/L (ref 20–31)
CREAT BLD-MCNC: 0.7 MG/DL (ref 0.6–1.3)
DEPRECATED RDW RBC AUTO: 49 FL (ref 37–54)
ERYTHROCYTE [DISTWIDTH] IN BLOOD BY AUTOMATED COUNT: 14.2 % (ref 11.3–14.5)
GFR SERPL CREATININE-BSD FRML MDRD: 82 ML/MIN/1.73
GLOBULIN UR ELPH-MCNC: 2.1 GM/DL
GLUCOSE BLD-MCNC: 78 MG/DL (ref 70–100)
HCT VFR BLD AUTO: 33.1 % (ref 34.5–44)
HGB BLD-MCNC: 10.8 G/DL (ref 11.5–15.5)
LV EF 2D ECHO EST: 55 %
MAXIMAL PREDICTED HEART RATE: 147 BPM
MCH RBC QN AUTO: 31.3 PG (ref 27–31)
MCHC RBC AUTO-ENTMCNC: 32.6 G/DL (ref 32–36)
MCV RBC AUTO: 95.9 FL (ref 80–99)
PLATELET # BLD AUTO: 287 10*3/MM3 (ref 150–450)
PMV BLD AUTO: 8.9 FL (ref 6–12)
POTASSIUM BLD-SCNC: 5.1 MMOL/L (ref 3.5–5.5)
PROT SERPL-MCNC: 4.7 G/DL (ref 5.7–8.2)
RBC # BLD AUTO: 3.45 10*6/MM3 (ref 3.89–5.14)
SODIUM BLD-SCNC: 137 MMOL/L (ref 132–146)
STRESS TARGET HR: 125 BPM
WBC NRBC COR # BLD: 8.01 10*3/MM3 (ref 3.5–10.8)

## 2018-01-02 PROCEDURE — 97162 PT EVAL MOD COMPLEX 30 MIN: CPT

## 2018-01-02 PROCEDURE — 99233 SBSQ HOSP IP/OBS HIGH 50: CPT | Performed by: HOSPITALIST

## 2018-01-02 PROCEDURE — 76705 ECHO EXAM OF ABDOMEN: CPT

## 2018-01-02 PROCEDURE — 83880 ASSAY OF NATRIURETIC PEPTIDE: CPT | Performed by: INTERNAL MEDICINE

## 2018-01-02 PROCEDURE — 99223 1ST HOSP IP/OBS HIGH 75: CPT | Performed by: INTERNAL MEDICINE

## 2018-01-02 PROCEDURE — 85027 COMPLETE CBC AUTOMATED: CPT | Performed by: HOSPITALIST

## 2018-01-02 PROCEDURE — 80053 COMPREHEN METABOLIC PANEL: CPT | Performed by: HOSPITALIST

## 2018-01-02 PROCEDURE — 97166 OT EVAL MOD COMPLEX 45 MIN: CPT

## 2018-01-02 RX ADMIN — AMIODARONE HYDROCHLORIDE 200 MG: 200 TABLET ORAL at 20:40

## 2018-01-02 RX ADMIN — PAROXETINE HYDROCHLORIDE 20 MG: 20 TABLET, FILM COATED ORAL at 08:03

## 2018-01-02 RX ADMIN — PANTOPRAZOLE SODIUM 40 MG: 40 TABLET, DELAYED RELEASE ORAL at 08:03

## 2018-01-02 RX ADMIN — ATORVASTATIN CALCIUM 20 MG: 20 TABLET, FILM COATED ORAL at 20:39

## 2018-01-02 RX ADMIN — Medication 5 MG: at 20:39

## 2018-01-02 RX ADMIN — DIGOXIN 125 MCG: 125 TABLET ORAL at 11:24

## 2018-01-02 RX ADMIN — AMLODIPINE BESYLATE 5 MG: 5 TABLET ORAL at 08:03

## 2018-01-02 RX ADMIN — GUAIFENESIN 600 MG: 600 TABLET, EXTENDED RELEASE ORAL at 08:03

## 2018-01-02 RX ADMIN — LISINOPRIL 2.5 MG: 2.5 TABLET ORAL at 08:03

## 2018-01-02 RX ADMIN — ASPIRIN 81 MG 81 MG: 81 TABLET ORAL at 08:03

## 2018-01-02 RX ADMIN — Medication 100 MG: at 08:03

## 2018-01-02 RX ADMIN — PANTOPRAZOLE SODIUM 40 MG: 40 TABLET, DELAYED RELEASE ORAL at 16:34

## 2018-01-02 RX ADMIN — AMIODARONE HYDROCHLORIDE 200 MG: 200 TABLET ORAL at 08:03

## 2018-01-02 RX ADMIN — GUAIFENESIN 600 MG: 600 TABLET, EXTENDED RELEASE ORAL at 20:40

## 2018-01-02 RX ADMIN — FOLIC ACID 1 MG: 1 TABLET ORAL at 08:03

## 2018-01-02 NOTE — PLAN OF CARE
Problem: Fall Risk (Adult)  Goal: Identify Related Risk Factors and Signs and Symptoms  Outcome: Ongoing (interventions implemented as appropriate)   01/01/18 0236   Fall Risk   Fall Risk: Related Risk Factors fatigue/slow reaction;gait/mobility problems;environment unfamiliar   Fall Risk: Signs and Symptoms presence of risk factors       Problem: Respiratory Insufficiency (Adult)  Goal: Identify Related Risk Factors and Signs and Symptoms  Outcome: Ongoing (interventions implemented as appropriate)   01/01/18 0236   Respiratory Insufficiency   Related Risk Factors (Respiratory Insufficiency) activity intolerance;motivation lacking   Signs and Symptoms (Respiratory Insufficiency) cough (productive/ineffective);decreased oxygen saturation;shortness of breath       Problem: Activity Intolerance (Adult)  Goal: Identify Related Risk Factors and Signs and Symptoms  Outcome: Ongoing (interventions implemented as appropriate)   01/02/18 0326   Activity Intolerance   Activity Intolerance: Related Risk Factors deconditioned state;functional decline;generalized weakness;impaired pulmonary function;O2 supply/demand imbalance;sedentary lifestyle   Signs and Symptoms (Activity Intolerance) dyspnea/shortness of breath

## 2018-01-02 NOTE — PLAN OF CARE
Problem: Patient Care Overview (Adult)  Goal: Plan of Care Review  Outcome: Ongoing (interventions implemented as appropriate)   01/02/18 0953   Coping/Psychosocial Response Interventions   Plan Of Care Reviewed With patient   Patient Care Overview   Progress progress toward functional goals as expected   Outcome Evaluation   Outcome Summary/Follow up Plan PT eval completed. Pt presents with decreased activity adalberto, generalized weakness, and decreased independence with mobility. Pt req Margareth x2 for sit to stand from EOB, maxAx2 for toilet t/f due to low surface; amb 41 feet with RW and Margareth x2. PT recommends d/c to acute rehab.       Problem: Inpatient Physical Therapy  Goal: Transfer Training Goal 1 LTG- PT  Outcome: Ongoing (interventions implemented as appropriate)   01/02/18 0953   Transfer Training PT LTG   Transfer Training PT LTG, Date Established 01/02/18   Transfer Training PT LTG, Time to Achieve 2 wks   Transfer Training PT LTG, Activity Type bed to chair /chair to bed;sit to stand/stand to sit   Transfer Training PT LTG, [place] Level supervision required   Transfer Training PT LTG, Assist Device walker, rolling   Transfer Training PT LTG, Outcome goal ongoing     Goal: Gait Training Goal LTG- PT  Outcome: Ongoing (interventions implemented as appropriate)   01/02/18 0953   Gait Training PT LTG   Gait Training Goal PT LTG, Date Established 01/02/18   Gait Training Goal PT LTG, Time to Achieve 2 wks   Gait Training Goal PT LTG, [place] Level conditional independence   Gait Training Goal PT LTG, Assist Device walker, rolling   Gait Training Goal PT LTG, Distance to Achieve 150   Gait Training Goal PT LTG, Outcome goal ongoing     Goal: Static Standing Balance Goal LTG- PT  Outcome: Ongoing (interventions implemented as appropriate)   01/02/18 0953   Static Standing Balance PT LTG   Static Standing Balance PT LTG, Date Established 01/02/18   Static Standing Balance PT LTG, Time to Achieve 2 wks    Static Standing Balance PT LTG, [place] Level conditional independence   Static Standing Balance PT LTG, Assist Device assistive Device   Static Standing Balance PT LTG, Outcome goal ongoing

## 2018-01-02 NOTE — PROGRESS NOTES
Discharge Planning Assessment  [facility]     Patient Name: Margarita Davila  MRN: [id number]  Today's Date: 1/2/2018    Admit Date: 12/31/2017          Discharge Needs Assessment       01/02/18 1356    Living Environment    Lives With child(wilberto), adult    Living Arrangements house    Home Accessibility no concerns    Type of Financial/Environmental Concern none    Transportation Available car;family or friend will provide    Living Environment    Provides Primary Care For no one    Primary Care Provided By child(wilberto) (specify)   daughter    Quality Of Family Relationships supportive    Able to Return to Prior Living Arrangements yes    Discharge Needs Assessment    Concerns To Be Addressed no discharge needs identified    Anticipated Changes Related to Illness none    Equipment Currently Used at Home oxygen;walker, rolling;wheelchair;commode    Equipment Needed After Discharge none    Discharge Facility/Level Of Care Needs rehabilitation facility            Discharge Plan       01/02/18 1357    Case Management/Social Work Plan    Plan Rehab    Patient/Family In Agreement With Plan yes    Additional Comments Spoke with pt and pt's daughter at bedside. Pt and pt's daughter are interested in rehab. Pt an dpt's daughter live in [place] and report they would like [facility] in [place]. Pt's daughter also explained that pt's insurance has switched over to Medicare. Pt's daughter will bring in new insurance card to have on file both for rehab as well as hospital services.     Final Note    Final Note SW is following        Discharge Placement     No information found                Demographic Summary       01/02/18 1355    Referral Information    Reason For Consult discharge planning            Functional Status       01/02/18 1355    Functional Status Prior    Ambulation 1-->assistive equipment    Transferring 1-->assistive equipment    Toileting 1-->assistive equipment    Bathing 0-->independent    Dressing  0-->independent    Eating 0-->independent    Communication 0-->understands/communicates without difficulty    IADL    Medications independent    Meal Preparation assistive person    Housekeeping assistive person    Laundry assistive person    Shopping assistive person    Oral Care independent            Psychosocial     None            Abuse/Neglect     None            Legal     None            Substance Abuse     None            Patient Forms     None          LATRICIA Herrera

## 2018-01-02 NOTE — PLAN OF CARE
Problem: Patient Care Overview (Adult)  Goal: Plan of Care Review  Outcome: Ongoing (interventions implemented as appropriate)   01/02/18 1126   Coping/Psychosocial Response Interventions   Plan Of Care Reviewed With patient   Patient Care Overview   Progress progress toward functional goals as expected   Outcome Evaluation   Outcome Summary/Follow up Plan OT eval complete. Pt presents with decreased activity tolerance, weakness,and decreased balance limiting independence with self care. OT will follow to advance pt toward PLOF with ADLs. Recommend IP rehab upon d/c.        Problem: Inpatient Occupational Therapy  Goal: Bed Mobility Goal LTG- OT  Outcome: Ongoing (interventions implemented as appropriate)   01/02/18 1126   Bed Mobility OT LTG   Bed Mobility OT LTG, Date Established 01/02/18   Bed Mobility OT LTG, Time to Achieve by discharge   Bed Mobility OT LTG, Activity Type supine to sit/sit to supine   Bed Mobility OT LTG, [place] Level contact guard assist   Bed Mobility OT LTG, Assist Device bed rails     Goal: Transfer Training Goal 1 LTG- OT  Outcome: Ongoing (interventions implemented as appropriate)   01/02/18 1126   Transfer Training OT LTG   Transfer Training OT LTG, Date Established 01/02/18   Transfer Training OT LTG, Time to Achieve by discharge   Transfer Training OT LTG, Activity Type toilet;bed to chair /chair to bed   Transfer Training OT LTG, [place] Level contact guard assist   Transfer Training OT LTG, Assist Device walker, rolling     Goal: Strength Goal LTG- OT  Outcome: Ongoing (interventions implemented as appropriate)   01/02/18 1126   Strength OT LTG   Strength Goal OT LTG, Date Established 01/02/18   Strength Goal OT LTG, Time to Achieve by discharge   Strength Goal OT LTG, Measure to Achieve PT will complete BUE AROM 10-15 reps daily as needed to support ADLs     Goal: Patient Education Goal LTG- OT  Outcome: Ongoing (interventions implemented as appropriate)   01/02/18 1126    Patient Education OT LTG   Patient Education OT LTG, Date Established 01/02/18   Patient Education OT LTG, Time to Achieve by discharge   Patient Education OT LTG, Education Type written program;HEP;energy conservation;work simplification;adaptive breathing   Patient Education OT LTG, Education Understanding demonstrates adequately;verbalizes understanding

## 2018-01-02 NOTE — THERAPY EVALUATION
Acute Care - Occupational Therapy Initial Evaluation  [facility]     Patient Name: Margarita Davila  : 1945  MRN: [id number]  Today's Date: 2018  Onset of Illness/Injury or Date of Surgery Date: 17  Date of Referral to OT: 17  Referring Physician: LULU Torres    Admit Date: 2017       ICD-10-CM ICD-9-CM   1. Impaired functional mobility, balance, gait, and endurance Z74.09 V49.89   2. Impaired mobility and ADLs Z74.09 799.89     Patient Active Problem List   Diagnosis   • Atrial fibrillation   • Medical non-compliance   • History of tobacco abuse   • History of alcohol abuse   • End stage COPD   • Supplemental oxygen dependent   • Recurrent right pleural effusion   • Recurrent left pleural effusion   • Debility   • Hyperlipidemia   • Depression with anxiety   • Hypertension   • Pleural effusion     Past Medical History:   Diagnosis Date   • Anxiety    • COPD (chronic obstructive pulmonary disease)    • Depression    • Hypertension    • Stroke      History reviewed. No pertinent surgical history.       OT ASSESSMENT FLOWSHEET (last 72 hours)      OT Evaluation       18 0906 18 0905 17 2220          Rehab Evaluation    Document Type evaluation  -AC evaluation  -SJ       Subjective Information agree to therapy;complains of;weakness  -AC agree to therapy;complains of;weakness  -SJ       Patient Effort, Rehab Treatment good  -AC good  -SJ       Symptoms Noted During/After Treatment fatigue  -AC fatigue  -SJ       General Information    Patient Profile Review yes  -AC yes  -SJ       Onset of Illness/Injury or Date of Surgery Date 17  -AC 17  -SJ       Referring Physician LULU Torres  -LULU Noriega  -KASSIE       General Observations Pt supine in bed.  Pt on 2LO2  -AC Pt supine in bed, awake; has tele, O2nc  -SJ       Pertinent History Of Current Problem Pt admit with weakness and SOA, and found to have recurrent R pleural effusion  -AC 72 y.o.F with progressive  weakness, SOB; t/f from OSH for HLOC due to atelectasis, collapsed R lung. Has hx of COPD with home O2 use at 2L/min  -SJ       Precautions/Limitations fall precautions;oxygen therapy device and L/min  -AC fall precautions;oxygen therapy device and L/min;NPO  -SJ       Prior Level of Function independent:;all household mobility;transfer;feeding;grooming;mod assist:;dressing;bathing  -AC min assist:;transfer;bed mobility;independent:;gait;all household mobility  -SJ       Equipment Currently Used at Home raised toilet;oxygen;walker, standard;wheelchair  -AC raised toilet;respiratory supplies;walker, standard;wheelchair  -SJ raised toilet;respiratory supplies;walker, standard;wheelchair  -      Plans/Goals Discussed With patient;agreed upon  -AC patient;agreed upon  -       Risks Reviewed patient:;LOB;increased discomfort  -AC patient:;LOB;increased discomfort;change in vital signs;lines disloged  -SJ       Benefits Reviewed patient:;improve function;increase independence;increase strength;increase balance;increase knowledge  -AC patient:;improve function;increase independence;increase strength;increase balance;increase knowledge  -SJ       Barriers to Rehab medically complex;previous functional deficit  -AC medically complex;previous functional deficit  -SJ       Living Environment    Lives With child(wilberto), adult   dtr  -AC child(wilberto), adult  -SJ child(wilberto), adult  -CJ      Living Arrangements house  -AC house  -SJ house  -CJ      Home Accessibility stairs within home;bath not on first floor;stairs to enter home  -AC stairs within home;bed not on first floor  -SJ stairs within home;bed not on first floor  -CJ      Number of Stairs to Enter Home 1  -AC        Number of Stairs Within Home 12  -AC        Stair Railings at Home   inside, present at both sides  -CJ      Type of Financial/Environmental Concern   none  -CJ      Transportation Available   family or friend will provide  -CJ      Clinical Impression     Date of Referral to OT 12/31/17  -        OT Diagnosis ADL decline  -        Patient/Family Goals Statement Pt would like to increase independence with self care  -        Criteria for Skilled Therapeutic Interventions Met yes;treatment indicated  -        Rehab Potential good, to achieve stated therapy goals  -        Therapy Frequency daily  -AC        Anticipated Discharge Disposition inpatient rehabilitation facility  -        Functional Level Prior    Ambulation   3-->assistive equipment and person  -CJ      Transferring   3-->assistive equipment and person  -CJ      Toileting   3-->assistive equipment and person  -CJ      Bathing   3-->assistive equipment and person  -CJ      Dressing   2-->assistive person  -CJ      Eating   0-->independent  -CJ      Communication   0-->understands/communicates without difficulty  -      Swallowing   0-->swallows foods/liquids without difficulty  -      Vital Signs    Pre Systolic BP Rehab 100  -  -SJ       Pre Treatment Diastolic BP 69  -AC 69  -SJ       Posttreatment Heart Rate (beats/min) 93  -AC 93  -SJ       Post SpO2 (%) 95  -        O2 Delivery Post Treatment supplemental O2  -        Pain Assessment    Pain Assessment No/denies pain  - No/denies pain  -       Vision Assessment/Intervention    Visual Impairment WNL  -        Cognitive Assessment/Intervention    Current Cognitive/Communication Assessment functional  - functional  -       Orientation Status oriented x 4  - oriented x 4  -SJ       Follows Commands/Answers Questions 100% of the time;needs cueing;needs repetition  - 100% of the time;able to follow single-step instructions;needs cueing  -       Personal Safety mild impairment  - mild impairment;decreased awareness, need for assist;decreased awareness, need for safety  -       Personal Safety Interventions fall prevention program maintained;gait belt;nonskid shoes/slippers when out of bed  - fall prevention  program maintained;gait belt;muscle strengthening facilitated;nonskid shoes/slippers when out of bed  -SJ       ROM (Range of Motion)    General ROM no range of motion deficits identified  -AC no range of motion deficits identified  -SJ       MMT (Manual Muscle Testing)    General MMT Assessment upper extremity strength deficits identified  -AC lower extremity strength deficits identified  -       General MMT Assessment Detail BUE grossly 4-/5  -AC BLE's 4-/5  -SJ       Bed Mobility, Assessment/Treatment    Bed Mobility, Assistive Device head of bed elevated;bed rails  -AC head of bed elevated;bed rails  -       Bed Mob, Supine to Sit, [place] minimum assist (75% patient effort)  -AC minimum assist (75% patient effort)  -       Bed Mobility, Safety Issues decreased use of arms for pushing/pulling;decreased use of legs for bridging/pushing  - decreased use of arms for pushing/pulling;decreased use of legs for bridging/pushing  -       Bed Mobility, Impairments strength decreased;impaired balance  -AC strength decreased;impaired balance  -       Bed Mobility, Comment  assist req to raise trunk up into sitting  -       Transfer Assessment/Treatment    Transfers, Sit-Stand [place] verbal cues required;minimum assist (75% patient effort);2 person assist required  - minimum assist (75% patient effort);2 person assist required;verbal cues required  -SJ       Transfers, Stand-Sit [place] verbal cues required;minimum assist (75% patient effort);2 person assist required  - minimum assist (75% patient effort);verbal cues required  -SJ       Transfers, Sit-Stand-Sit, Assist Device rolling walker  - rolling walker  -       Toilet Transfer, [place] verbal cues required;maximum assist (25% patient effort);2 person assist required  - maximum assist (25% patient effort);2 person assist required;verbal cues required  -       Toilet Transfer, Assistive Device rolling walker  -AC rolling  walker;other (see comments)   grab bars  -       Transfer, Safety Issues sequencing ability decreased;step length decreased  - sequencing ability decreased;weight-shifting ability decreased  -       Transfer, Impairments strength decreased;impaired balance  -AC strength decreased;impaired balance  -       Transfer, Comment difficulty standing from low toilet surface  - Pt had increased difficulty with toilet t/f due to low surface  -       Functional Mobility    Functional Mobility- [place]. Level verbal cues required;minimum assist (75% patient effort);2 person assist required  -        Functional Mobility- Device rolling walker  -        Functional Mobility-Distance (Feet) 42  -AC        Functional Mobility- Safety Issues step length decreased  -        Motor Skills/Interventions    Additional Documentation Balance Skills Training (Group)  - Balance Skills Training (Group)  -       Balance Skills Training    Sitting-Level of Assistance Close supervision  - Close supervision  -       Sitting-Balance Support Right upper extremity supported;Left upper extremity supported  - Right upper extremity supported;Left upper extremity supported;Feet supported  -       Standing-Level of Assistance Contact guard;x2  -AC Contact guard;x2  -SJ       Static Standing Balance Support assistive device  - assistive device  -       Gait Balance-Level of Assistance Minimum assistance;x2  -AC Minimum assistance;x2  -SJ       Gait Balance Support assistive device  - assistive device  -       General Therapy Interventions    Planned Therapy Interventions ADL retraining;balance training;bed mobility training;strengthening;transfer training  -        Positioning and Restraints    Pre-Treatment Position in bed  - in bed  -       Post Treatment Position chair  - chair  -       In Chair reclined;call light within reach;encouraged to call for assist;exit alarm on  -AC reclined;call light within  reach;encouraged to call for assist;exit alarm on;heels elevated  -SJ         User Key  (r) = Recorded By, (t) = Taken By, (c) = Cosigned By    Initials Name Effective Dates     Iva Delgado OT 06/23/15 -     SJ Aaliyah Hilton, PT 06/19/15 -     CJ Ami Harris RN 06/16/16 -            Occupational Therapy Education     Title: PT OT SLP Therapies (Active)     Topic: Occupational Therapy (Active)     Point: ADL training (Done)    Description: Instruct learner(s) on proper safety adaptation and remediation techniques during self care or transfers.   Instruct in proper use of assistive devices.    Learning Progress Summary    Learner Readiness Method Response Comment Documented by Status   Patient Acceptance E VU benefits of activity, role of OT  01/02/18 1125 Done                      User Key     Initials Effective Dates Name Provider Type Discipline     06/23/15 -  Iva Delgado OT Occupational Therapist OT                  OT Recommendation and Plan  Anticipated Discharge Disposition: inpatient rehabilitation facility  Planned Therapy Interventions: ADL retraining, balance training, bed mobility training, strengthening, transfer training  Therapy Frequency: daily  Plan of Care Review  Plan Of Care Reviewed With: patient  Progress: progress toward functional goals as expected  Outcome Summary/Follow up Plan: OT eval complete.  Pt presents with decreased activity tolerance, weakness,and decreased  balance limiting independence with self care.  OT will follow to advance pt toward PLOF with ADLs.  Recommend IP rehab upon d/c.            OT Goals       01/02/18 1126          Bed Mobility OT LTG    Bed Mobility OT LTG, Date Established 01/02/18  -AC      Bed Mobility OT LTG, Time to Achieve by discharge  -      Bed Mobility OT LTG, Activity Type supine to sit/sit to supine  -      Bed Mobility OT LTG, [place] Level contact guard assist  -AC      Bed Mobility OT LTG, Assist Device bed rails  -AC       Transfer Training OT LTG    Transfer Training OT LTG, Date Established 01/02/18  -AC      Transfer Training OT LTG, Time to Achieve by discharge  -AC      Transfer Training OT LTG, Activity Type toilet;bed to chair /chair to bed  -AC      Transfer Training OT LTG, [place] Level contact guard assist  -AC      Transfer Training OT LTG, Assist Device walker, rolling  -AC      Strength OT LTG    Strength Goal OT LTG, Date Established 01/02/18  -AC      Strength Goal OT LTG, Time to Achieve by discharge  -AC      Strength Goal OT LTG, Measure to Achieve PT will complete BUE AROM 10-15 reps daily as needed to support ADLs  -AC      Patient Education OT LTG    Patient Education OT LTG, Date Established 01/02/18  -AC      Patient Education OT LTG, Time to Achieve by discharge  -AC      Patient Education OT LTG, Education Type written program;HEP;energy conservation;work simplification;adaptive breathing  -AC      Patient Education OT LTG, Education Understanding demonstrates adequately;verbalizes understanding  -AC        User Key  (r) = Recorded By, (t) = Taken By, (c) = Cosigned By    Initials Name Provider Type    AC Iva Delgado, OT Occupational Therapist                Outcome Measures       01/02/18 0906 01/02/18 0905       How much help from another person do you currently need...    Turning from your back to your side while in flat bed without using bedrails?  3  -SJ     Moving from lying on back to sitting on the side of a flat bed without bedrails?  3  -SJ     Moving to and from a bed to a chair (including a wheelchair)?  3  -SJ     Standing up from a chair using your arms (e.g., wheelchair, bedside chair)?  2  -SJ     Climbing 3-5 steps with a railing?  2  -SJ     To walk in hospital room?  3  -SJ     AM-PAC 6 Clicks Score  16  -SJ     How much help from another is currently needed...    Putting on and taking off regular lower body clothing? 2  -AC      Bathing (including washing, rinsing, and drying) 2   -AC      Toileting (which includes using toilet bed pan or urinal) 2  -AC      Putting on and taking off regular upper body clothing 3  -AC      Taking care of personal grooming (such as brushing teeth) 3  -AC      Eating meals 4  -AC      Score 16  -AC      Functional Assessment    Outcome Measure Options AM-PAC 6 Clicks Daily Activity (OT)  -AC AM-PAC 6 Clicks Basic Mobility (PT)  -SJ       User Key  (r) = Recorded By, (t) = Taken By, (c) = Cosigned By    Initials Name Provider Type    AC Iva Delgado, OT Occupational Therapist    KASSIE Hilton, PT Physical Therapist          Time Calculation:   OT Start Time: 0906    Therapy Charges for Today     Code Description Service Date Service Provider Modifiers Qty    [id number]  OT EVAL MOD COMPLEXITY 4 1/2/2018 Iva Delgado OT GO 1               Iva Delgado OT  1/2/2018

## 2018-01-02 NOTE — PAYOR COMM NOTE
"Aneudy Bullock (72 y.o. Female)   Ref # [id number]  Lor Mitchell RN, BSN  Phone # [phone]  Fax # [phone]    Date of Birth Social Security Number Address Home Phone MRN    1945  [address] [address] [phone] [id number]    Judaism Marital Status          None        Admission Date Admission Type Admitting Provider Attending Provider Department, Room/Bed    17 Elective Musa Carson MD Repass, Gregory L, MD [facility] 6B, N624/1    Discharge Date Discharge Disposition Discharge Destination                      Attending Provider: Musa Carson MD     Allergies:  Penicillins    Isolation:  None   Infection:  None   Code Status:  FULL    Ht:  170.2 cm (67\")   Wt:  63.8 kg (140 lb 11.2 oz)    Admission Cmt:  None   Principal Problem:  Recurrent right pleural effusion [J90]                 Active Insurance as of 2017     Primary Coverage     Payor Plan Insurance Group Employer/Plan Group    MISC MEDICARE REPLACMENT [facility] [facility]      Coverage Address Coverage Phone Number Effective From Effective To    [address] [phone] 2017     [place] [address]       Subscriber Name Subscriber Birth Date Member ID       ANEUDY BULLOCK 1945 [id number]                 Emergency Contacts      (Rel.) Home Phone Work Phone Mobile Phone    Aneudy Torres (Daughter) -- -- [phone]               History & Physical      Nette Vo MD at 2017 10:34 PM              [facility] Medicine Services  HISTORY AND PHYSICAL    Patient Name: Aneudy Bullock  : 1945  MRN: [id number]  Primary Care Physician: ADDIE Ye    Subjective   Subjective     Chief Complaint:  SOA, poor appetite     HPI:  Aneudy Bullock is a 72 y.o. female with PMH significant for HTN, HLD, end stage COPD, a-fib, tobacco abuse, and ETOH abuse, That originally presented to [facility] on 2017 with " complaint of SOA. She reportedly was in [facility] x 2 over the past month with the most recent admission being 3 weeks ago due to medical noncompliance with her aifb and increased debility. It was felt that she would benefit from inpt rehab, but her insure denied payment, so she went home with her daughter. The daughter notes that after discharge, she was doing poorly with decreased appetite and weakness as well as progressively short of breath prompting her presentation to the [facility] ED.   Upon arrival to the OSH, CXR revealed complete opacification of the right hemithorax with possible combination of effusion and atelectasis as well as abrupt cutoff of the right mainstem bronchus with suspicion of centrally obstructing mass. CT chest PE protocol was negative for PE, and confirmed large pleural effusion with complete atelectaic collapse of the right lung with obstruction of the right mainstem bronchus from possible mucus plugging, or endobronchial neoplasm  She then underwent CT guided thoracentesis on 12/21/17 with successful drainage of  1500 ml of fluid with negative pathology report. She then began to have SOA again, and repeat of the CXR revealed recurrent Right pleural effusion.  Decision was made to transfer to [facility] due to inability of the OSH for repeat thoracentesis, and Pulmonolgy consult.    Patient currently appears comfortable and in no acute distress.  Patient is a very poor historian but states she had been feeling fine other than having a poor appetite prior to going to [facility].  No family is present.  Patient will be admitted to [facility] under the care of the Hospitalist for further evaluation and treatment.      Review of Systems   Constitutional: Positive for appetite change and fatigue. Negative for activity change, chills, diaphoresis, fever and unexpected weight change.   HENT: Negative.    Eyes: Negative.    Respiratory: Positive for cough and shortness of breath. Negative for  wheezing.    Cardiovascular: Negative for chest pain, palpitations and leg swelling.   Gastrointestinal: Positive for nausea and vomiting. Negative for abdominal distention, abdominal pain, blood in stool, constipation and diarrhea.   Endocrine: Negative.    Genitourinary: Negative for difficulty urinating, dysuria, flank pain, frequency and hematuria.   Musculoskeletal: Negative.    Skin: Negative.    Allergic/Immunologic: Negative.    Neurological: Negative for dizziness, facial asymmetry, speech difficulty, weakness, light-headedness and headaches.   Hematological: Negative.    Psychiatric/Behavioral: Negative.           Otherwise 10-system ROS reviewed and is negative except as mentioned in the HPI.    Personal History     Past Medical History:   Diagnosis Date   • Anxiety    • COPD (chronic obstructive pulmonary disease)    • Depression    • Hypertension    • Stroke        History reviewed. No pertinent surgical history.    Family History: family history includes Colon cancer in her mother; Tuberculosis in her father.     Social History:  reports that she has quit smoking. Her smoking use included Cigarettes. She smoked 2.00 packs per day. She has never used smokeless tobacco. She reports that she does not drink alcohol or use illicit drugs.  Social History     Social History Narrative    Currently resides in [facility] with daughter        Medications:  Prescriptions Prior to Admission   Medication Sig Dispense Refill Last Dose   • ALPRAZolam (XANAX) 0.25 MG tablet Take 0.25 mg by mouth Daily As Needed for Anxiety.      • amiodarone (PACERONE) 200 MG tablet Take 200 mg by mouth 2 (Two) Times a Day.      • amLODIPine (NORVASC) 5 MG tablet Take 5 mg by mouth Daily.      • aspirin 81 MG chewable tablet Chew 81 mg Daily.      • atorvastatin (LIPITOR) 20 MG tablet Take 20 mg by mouth Every Night.      • Caffeine-Magnesium Salicylate (DIUREX) .5 MG tablet Take  by mouth.      • digoxin (LANOXIN) 125 MCG  tablet Take 125 mcg by mouth 4 (Four) Times a Week. Saturday, Monday, Wednesday, Thursday      • folic acid (FOLVITE) 1 MG tablet Take 1 mg by mouth Daily.      • guaiFENesin (MUCINEX) 600 MG 12 hr tablet Take 600 mg by mouth 2 (Two) Times a Day.      • lisinopril (PRINIVIL,ZESTRIL) 2.5 MG tablet Take 2.5 mg by mouth Daily.      • melatonin 5 MG sublingual tablet sublingual tablet Place  under the tongue At Night As Needed.      • metoprolol tartrate (LOPRESSOR) 25 MG tablet Take 25 mg by mouth 2 (Two) Times a Day.      • pantoprazole (PROTONIX) 40 MG EC tablet Take 40 mg by mouth 2 (Two) Times a Day.      • PARoxetine (PAXIL) 20 MG tablet Take 20 mg by mouth Every Morning.      • simvastatin (ZOCOR) 20 MG tablet Take 20 mg by mouth Daily.      • thiamine (VITAMIN B-1) 100 MG tablet Take 100 mg by mouth Daily.          Allergies   Allergen Reactions   • Penicillins      Numbness in nose       Objective   Objective     Vital Signs:   Temp:  [97.5 °F (36.4 °C)] 97.5 °F (36.4 °C)  Heart Rate:  [85-86] 85  Resp:  [18] 18  BP: (126-127)/(84-92) 126/84        Physical Exam   Constitutional: She is oriented to person, place, and time. She appears well-developed and well-nourished. No distress.   HENT:   Head: Normocephalic and atraumatic.   Eyes: Conjunctivae and EOM are normal. Pupils are equal, round, and reactive to light. Right eye exhibits no discharge. Left eye exhibits no discharge. No scleral icterus.   Neck: Normal range of motion. Neck supple. No JVD present. No tracheal deviation present. No thyromegaly present.   Cardiovascular: Normal rate, regular rhythm and intact distal pulses.  Exam reveals no gallop and no friction rub.    No murmur heard.  Pulmonary/Chest: Effort normal. No stridor. She has rales.   Crackles heard throughout right lung and left lower lobe.    Abdominal: Soft. Bowel sounds are normal. She exhibits no distension and no mass. There is no tenderness. There is no rebound and no guarding. No  hernia.   Musculoskeletal: Normal range of motion. She exhibits no edema, tenderness or deformity.   Lymphadenopathy:     She has no cervical adenopathy.   Neurological: She is alert and oriented to person, place, and time. She has normal reflexes.   Skin: Skin is warm and dry. No rash noted. She is not diaphoretic. No erythema. No pallor.   Scattered ecchymosis on bilateral upper extremities.     Psychiatric: She has a normal mood and affect. Her behavior is normal. Judgment and thought content normal.   Nursing note and vitals reviewed.     Results Reviewed:  I have personally reviewed current lab, radiology, and data and agree.      Results from last 7 days  Lab Units 12/31/17  2225   WBC 10*3/mm3 9.52   HEMOGLOBIN g/dL 9.9*   HEMATOCRIT % 30.1*   PLATELETS 10*3/mm3 286       Results from last 7 days  Lab Units 12/31/17  2225   SODIUM mmol/L 135   POTASSIUM mmol/L 4.0   CHLORIDE mmol/L 96*   CO2 mmol/L 30.0   BUN mg/dL 15   CREATININE mg/dL 0.70   GLUCOSE mg/dL 107*   CALCIUM mg/dL 7.9*   ALT (SGPT) U/L 38   AST (SGOT) U/L 28     Brief Urine Lab Results     None        No results found for: BNP  No results found for: PHART  Imaging Results (last 24 hours)     ** No results found for the last 24 hours. **             Assessment/Plan   Assessment / Plan     Hospital Problem List     * (Principal)Recurrent right pleural effusion    Atrial fibrillation    Medical non-compliance    History of tobacco abuse    History of alcohol abuse    End stage COPD    Supplemental oxygen dependent    Recurrent left pleural effusion    Debility    Hyperlipidemia    Depression with anxiety    Hypertension    Pleural effusion            Assessment & Plan:  72 year old female with end stage COPD presents from [facility] for recurrent pleural effusion s/p right thoracentesis (1.5L drained)    1) Recurrent right pleural effusion  -etiology unclear  -? Underlying CHF vs liver disease  -check PT/INR  -labs pending  -check echo  in am  -repeat CXR now, last CXR performed in 12-  -NPO after midnight for possible intervention in am  -continuous pulse ox, keep o2 saturation >90%  -currently on 3L NC and o2 saturation 100%  -s/p right thoracentesis on 12- (1.5 ml transudative fluid noted) no malignant cells noted on pathology report    2) End stage COPD  -currently wears 2L nasal cannula continuously at home  -? Compliant with medications     -continuous pulse ox  -keep o2 ? >90%  -patient unsure if she is being followed by pulmonologist in past  -prn duo-nebs    3) Essential hypertension  -continue home medications with holding parameters    4) Depression with anxiety  -continue home medications    5) Chronic atrial fibrillation   -[id number] score: 5  -not on anti-coagulation due to non-compliance with medications  -continue home amiodarone and digoxin  -check dig level  -echo in am, no echo in records  -previously followed by Dr. Tatum   -EKG now  -check TSH    6) Hyperlipidemia  -continue statin      7) History of tobacco abuse  -quit approximately 3-4 years ago    8) Remote history of ETOH abuse  -per patient denies, but in reports confirms.  Daughter reports no recent alcohol use.         DVT prophylaxis:teds/scds defer anticoagulation due to possible intervention in am    CODE STATUS:  Full code   Admission Status:  I believe this patient meets INPATIENT status due to the need for care which can only be reasonably provided in an hospital setting such as aggressive/expedited ancillary services and/or consultation services, the necessity for IV medications, close physician monitoring and/or the possible need for procedures.  In such, I feel patient’s risk for adverse outcomes and need for care warrant INPATIENT evaluation and predict the patient’s care encounter to likely last beyond 2 midnights.      Kathleen Cedeno, LULU   12/31/17   11:15 PM      Brief Attending Admission Attestation     I have seen and examined the  patient, performing an independent face-to-face diagnostic evaluation with plan of care reviewed and developed with the advanced practice clinician (APC).      Brief Summary Statement/HPI:   Margarita Davila is a 72 y.o. female with COPD on 2L O2 at baseline, A-fib presenting in transfer from [facility] due to recurrent right pleural effusion.  She was initially admitted there for what she reports is vomiting and poor PO intake after two recent hospitalizations at [facility] as well as weakness.  N/V has since resolved.  She was noted to have large right pleural effusion as well as concern for right bronchus obstruction.  She had a right thoracentesis revealing transudative fluid.  She also had a bronchoscopy without malignant cells noted on pathology.  Pleural fluid cultures have no growth and sputum shows mixed respiratory chon.  There is no bronchoscopy report in the documents sent with her.  Due to recurrence of pleural effusion and lack of pulmonologist for the next week with no one else to do thoracentesis, as well as report of increasing O2 requirement with recurrent pleural effusion, transfer to [facility] was requested.    Attending Physical Exam:  Constitutional: No acute distress, awake, alert, sitting up in bed drinking coffee  Eyes: PERRLA, sclerae anicteric, no conjunctival injection  HENT: NCAT, mucous membranes moist  Neck: Supple, trachea midline  Respiratory: Clear to auscultation bilaterally, nonlabored respirations   Cardiovascular: Irregularly irregular, rate controlled, no murmurs, rubs, or gallops  Gastrointestinal: Positive bowel sounds, soft, nontender, nondistended  Musculoskeletal: No bilateral ankle edema, no clubbing or cyanosis to extremities  Psychiatric: Appropriate affect, cooperative  Neurologic:Cranial Nerves grossly intact to confrontation, speech clear  Skin: No rashes      Brief Assessment/Plan :  See above for further detailed assessment and plan developed with APC which I  have reviewed and/or edited.    -She is currently stable on her baseline O2  -Obtain CXR to evaluate pleural effusion  -Request bronchoscopy report from [facility] because although pathology from washings shows no malignant cells, I am not sure what they actually saw on bronchoscopy and there was concern for endobronchial lesion noted on CT chest  -Further workup for the etiology of her transudative bilateral pleural effusion with echocardiogram.  May consider abdominal ultrasound to evaluate for liver disease if echocardiogram is unrevealing although she did not appear to have significant ascites on exam.  She does have a heavy drinking history per outside documentation.  -PT/OT    I believe this patient meets INPATIENT status due to the need for care which can only be reasonably provided in an hospital setting such as aggressive/expedited ancillary services and/or consultation services, the necessity for IV medications, close physician monitoring and/or the possible need for procedures.  In such, I feel patient’s risk for adverse outcomes and need for care warrant INPATIENT evaluation and predict the patient’s care encounter to likely last beyond 2 midnights.    Nette Vo MD  18  1:30 AM            Electronically signed by Nette Vo MD at 2018  1:53 AM        Operative/Procedure Notes (last 72 hours) (Notes from 2017 12:10 PM through 2018 12:10 PM)     No notes of this type exist for this encounter.           Physician Progress Notes (last 72 hours) (Notes from 2017 12:10 PM through 2018 12:10 PM)      Musa Carson MD at 2018  7:54 AM  Version 2 of 2             [facility] Medicine Services  PROGRESS NOTE    Patient Name: Margarita Davila  : 1945  MRN: [id number]    Date of Admission: 2017  Length of Stay: 1  Primary Care Physician: ADDIE Ye    Subjective   Subjective     CC:  F/u pleff    HPI:  Patient resting in bed  when seen this morning.  She denies significant dyspnea and reports only minimal cough during my visit.  No fevers or chills.    Review of Systems  Gen- No fevers, chills  CV- No chest pain, palpitations  Resp- As above  GI- No N/V/D, abd pain    Otherwise ROS is negative except as mentioned in the HPI.    Objective   Objective     Vital Signs:   Temp:  [97.5 °F (36.4 °C)-98.1 °F (36.7 °C)] 98.1 °F (36.7 °C)  Heart Rate:  [73-92] 73  Resp:  [16-18] 16  BP: (117-130)/(74-92) 117/74     Physical Exam:  Constitutional: No acute distress, awake, alert  HENT: NCAT, mucous membranes moist  Respiratory: Decreased breath sounds right greater than left base, respiratory effort normal   Cardiovascular: RRR, no murmurs, rubs, or gallops, palpable pedal pulses bilaterally  Gastrointestinal: Positive bowel sounds, soft, nontender, nondistended  Musculoskeletal: No bilateral ankle edema  Psychiatric: Appropriate affect, cooperative  Neurologic: Oriented x 3, strength symmetric in all extremities, Cranial Nerves grossly intact to confrontation, speech clear  Skin: No rashes    Results Reviewed:  I have personally reviewed current lab, radiology, and data and agree.      Results from last 7 days  Lab Units 01/01/18  0548 12/31/17  2225   WBC 10*3/mm3 8.50 9.52   HEMOGLOBIN g/dL 9.8* 9.9*   HEMATOCRIT % 29.6* 30.1*   PLATELETS 10*3/mm3 294 286   INR  0.94  --        Results from last 7 days  Lab Units 01/01/18  0548 12/31/17  2225   SODIUM mmol/L 135 135   POTASSIUM mmol/L 4.4 4.0   CHLORIDE mmol/L 95* 96*   CO2 mmol/L 32.0* 30.0   BUN mg/dL 14 15   CREATININE mg/dL 0.70 0.70   GLUCOSE mg/dL 79 107*   CALCIUM mg/dL 8.1* 7.9*   ALT (SGPT) U/L  --  38   AST (SGOT) U/L  --  28     No results found for: BNP  No results found for: PHART    Microbiology Results Abnormal     None        Imaging Results (last 24 hours)     Procedure Component Value Units Date/Time    XR Chest PA & Lateral [[id number]] Collected:  12/31/17 8050     Updated:   01/01/18 0200    Narrative:       EXAM:    XR Chest, 2 Views    CLINICAL HISTORY:    72 years old, female; Signs and symptoms; Other: Pleural effusion    TECHNIQUE:    Frontal and lateral views of the chest.    COMPARISON:    No relevant prior studies available.    FINDINGS:    Lungs:  Hyperinflated.  Moderate right and small-to-moderate left pleural   effusions with underlying pulmonary opacities.    Pleural space:  See above.    Heart:  Unremarkable.    Mediastinum:  Unremarkable.    Bones/joints:  Unremarkable.      Impression:         Moderate right and small-to-moderate left pleural effusions with underlying   atelectasis plus or minus infiltrate.    THIS DOCUMENT HAS BEEN ELECTRONICALLY SIGNED BY HECTOR STACY MD             I have reviewed the medications.    Assessment/Plan   Assessment / Plan     Hospital Problem List     * (Principal)Recurrent right pleural effusion    Atrial fibrillation    Medical non-compliance    History of tobacco abuse    History of alcohol abuse    End stage COPD    Supplemental oxygen dependent    Recurrent left pleural effusion    Debility    Hyperlipidemia    Depression with anxiety    Hypertension    Pleural effusion           Brief Hospital Course to date:  Margarita Davila is a 72 y.o. female with a past medical history of hypertension, hyperlipidemia, end-stage COPD, atrial fibrillation, tobacco abuse, and alcohol abuse who was transferred to our facility on 12/31 from [facility] where she initially was admitted on 12/19.  Outside evaluation there disclosed complete opacification of the right hemithorax with evidence of a large pleural effusion on CT scan.  CT thoracentesis on 12/21 drained 1.5 L fluid and pt had a bronchoscopy with negative path per report (data deficient on presence of endobronchial lesion). Pt transferred here for higher level of care:    Assessment & Plan:    Recurrent right pleural effusion, transudative status post thoracentesis of 1.5 L on    Small to moderate left pleural effusion  - Need outside bronchoscopy report   - Follow up Echo ordered here    - Repeat CXR here with small to moderate right pleural effusion and small on left  - NPO after MN. Depending on results of echo, will consider diuresis or repeat thoracentesis and Pulm consult to occur tomorrow. At present, pt is comfortable and not in respiratory distress   - Hypoalbuminemia noted on CMP, suspect nutritional component, Dietician consult ordered, check liver US to rule out chronic liver disease as contributing      End-stage COPD, per report  Chronic nocturnal hypoxia, 2 LNC 2 at bedtime at home  - Supplemental O2, prn nebs, mucinex    Chronic primary hypertension  - BP normal on home meds     Weakness/debility  - PT/OT    Chronic Atrial Fibrillation, ChadsVasc 5  - Continue metoprolol   - Not chronically anticoagulated due to medication noncompliance per report  - F/u echo    Medical Noncompliance     Normocytic Anemia     Hyperlipidemia    Hx of Tobacco Abuse     Remote Alcohol Use     DVT Prophylaxis: mechanical given possible need for procedure   CODE STATUS: Full Code  Disposition: I expect the patient to be discharged home (?) in ~2 days depending on evaluation of pleff.    Musa Carson MD  18  7:54 AM           Electronically signed by Musa Carson MD at 2018  2:24 PM      Musa Carson MD at 2018  7:54 AM  Version 1 of 2             [facility] Medicine Services  PROGRESS NOTE    Patient Name: Margarita Davila  : 1945  MRN: [id number]    Date of Admission: 2017  Length of Stay: 1  Primary Care Physician: ADDIE Ye    Subjective   Subjective     CC:  F/u pleff    HPI:  Patient resting in bed when seen this morning.  She denies significant dyspnea and reports only minimal cough during my visit.  No fevers or chills.    Review of Systems  Gen- No fevers, chills  CV- No chest pain, palpitations  Resp- As  above  GI- No N/V/D, abd pain    Otherwise ROS is negative except as mentioned in the HPI.    Objective   Objective     Vital Signs:   Temp:  [97.5 °F (36.4 °C)-98.1 °F (36.7 °C)] 98.1 °F (36.7 °C)  Heart Rate:  [73-92] 73  Resp:  [16-18] 16  BP: (117-130)/(74-92) 117/74     Physical Exam:  Constitutional: No acute distress, awake, alert  HENT: NCAT, mucous membranes moist  Respiratory: Decreased breath sounds right greater than left base, respiratory effort normal   Cardiovascular: RRR, no murmurs, rubs, or gallops, palpable pedal pulses bilaterally  Gastrointestinal: Positive bowel sounds, soft, nontender, nondistended  Musculoskeletal: No bilateral ankle edema  Psychiatric: Appropriate affect, cooperative  Neurologic: Oriented x 3, strength symmetric in all extremities, Cranial Nerves grossly intact to confrontation, speech clear  Skin: No rashes    Results Reviewed:  I have personally reviewed current lab, radiology, and data and agree.      Results from last 7 days  Lab Units 01/01/18  0548 12/31/17  2225   WBC 10*3/mm3 8.50 9.52   HEMOGLOBIN g/dL 9.8* 9.9*   HEMATOCRIT % 29.6* 30.1*   PLATELETS 10*3/mm3 294 286   INR  0.94  --        Results from last 7 days  Lab Units 01/01/18  0548 12/31/17  2225   SODIUM mmol/L 135 135   POTASSIUM mmol/L 4.4 4.0   CHLORIDE mmol/L 95* 96*   CO2 mmol/L 32.0* 30.0   BUN mg/dL 14 15   CREATININE mg/dL 0.70 0.70   GLUCOSE mg/dL 79 107*   CALCIUM mg/dL 8.1* 7.9*   ALT (SGPT) U/L  --  38   AST (SGOT) U/L  --  28     No results found for: BNP  No results found for: PHART    Microbiology Results Abnormal     None        Imaging Results (last 24 hours)     Procedure Component Value Units Date/Time    XR Chest PA & Lateral [[id number]] Collected:  12/31/17 2330     Updated:  01/01/18 0200    Narrative:       EXAM:    XR Chest, 2 Views    CLINICAL HISTORY:    72 years old, female; Signs and symptoms; Other: Pleural effusion    TECHNIQUE:    Frontal and lateral views of the  chest.    COMPARISON:    No relevant prior studies available.    FINDINGS:    Lungs:  Hyperinflated.  Moderate right and small-to-moderate left pleural   effusions with underlying pulmonary opacities.    Pleural space:  See above.    Heart:  Unremarkable.    Mediastinum:  Unremarkable.    Bones/joints:  Unremarkable.      Impression:         Moderate right and small-to-moderate left pleural effusions with underlying   atelectasis plus or minus infiltrate.    THIS DOCUMENT HAS BEEN ELECTRONICALLY SIGNED BY HECTOR STACY MD             I have reviewed the medications.    Assessment/Plan   Assessment / Plan     Hospital Problem List     * (Principal)Recurrent right pleural effusion    Atrial fibrillation    Medical non-compliance    History of tobacco abuse    History of alcohol abuse    End stage COPD    Supplemental oxygen dependent    Recurrent left pleural effusion    Debility    Hyperlipidemia    Depression with anxiety    Hypertension    Pleural effusion           Brief Hospital Course to date:  Margarita Davila is a 72 y.o. female with a past medical history of hypertension, hyperlipidemia, end-stage COPD, atrial fibrillation, tobacco abuse, and alcohol abuse who was transferred to our facility on 12/31 from [facility] where she initially was admitted on 12/19.  Outside evaluation there disclosed complete opacification of the right hemithorax with evidence of a large pleural effusion on CT scan.  CT thoracentesis on 12/21 drained 1.5 L fluid and pt had a bronchoscopy with negative path per report (data deficient on presence of endobronchial lesion). Pt transferred here for higher level of care:    Assessment & Plan:    Recurrent right pleural effusion, transudative status post thoracentesis of 1.5 L on 12/21  Small to moderate left pleural effusion  - Need outside bronchoscopy report   - Follow up Echo ordered here    - Repeat CXR here with small to moderate right pleural effusion and small on  left  - NPO after MN. Depending on results of echo, will consider diuresis or repeat thoracentesis and Pulm consult to occur tomorrow. At present, pt is comfortable and not in respiratory distress     End-stage COPD, per report  Chronic nocturnal hypoxia, 2 LNC 2 at bedtime at home  - Supplemental O2, prn nebs, mucinex    Chronic primary hypertension  - BP normal on home meds     Weakness/debility  - PT/OT    Chronic Atrial Fibrillation, ChadsVasc 5  - Continue metoprolol   - Not chronically anticoagulated due to medication noncompliance per report  - F/u echo    Medical Noncompliance     Normocytic Anemia     Hyperlipidemia    Hx of Tobacco Abuse     Remote Alcohol Use     DVT Prophylaxis: mechanical given possible need for procedure   CODE STATUS: Full Code  Disposition: I expect the patient to be discharged home (?) in ~2 days depending on evaluation of pleff.    Musa Carson MD  01/01/18  7:54 AM           Electronically signed by Musa Carson MD at 1/1/2018  2:21 PM        Consult Notes (last 72 hours) (Notes from 12/30/2017 12:10 PM through 1/2/2018 12:10 PM)     No notes of this type exist for this encounter.      Study Result   EXAMINATION: US LIVER-01/02/2018:      INDICATION: Evaluate for chronic liver disease.          TECHNIQUE: Ultrasound of the right upper quadrant was performed in the  longitudinal and transverse planes.      COMPARISON: NONE.      FINDINGS: The pancreas is normal. The liver reveals no focal abnormality  or biliary ductal dilatation. The gallbladder demonstrates no wall  thickening or stones. The common bile duct measures 6 mm in AP dimension  with no evidence of common duct stone. The right kidney measures 10.1 cm  in length with no mass, stone or obstruction. There is a right pleural  effusion.      IMPRESSION:  Normal examination. There are no abnormality regarding the  pancreas, liver, gallbladder or right kidney.      D:  01/02/2018  E:  01/02/2018              This  report was finalized on 1/2/2018 11:51 AM by Dr. Bertin Church MD.         Study Result   EXAM:    XR Chest, 2 Views     CLINICAL HISTORY:    72 years old, female; Signs and symptoms; Other: Pleural effusion     TECHNIQUE:    Frontal and lateral views of the chest.     COMPARISON:    No relevant prior studies available.     FINDINGS:    Lungs:  Hyperinflated.  Moderate right and small-to-moderate left pleural   effusions with underlying pulmonary opacities.    Pleural space:  See above.    Heart:  Unremarkable.    Mediastinum:  Unremarkable.    Bones/joints:  Unremarkable.     IMPRESSION:    Moderate right and small-to-moderate left pleural effusions with underlying   atelectasis plus or minus infiltrate.     THIS DOCUMENT HAS BEEN ELECTRONICALLY SIGNED BY HECTOR STACY MD     Interpretation Summary   · Left ventricular systolic function is normal. Estimated EF = 55%.  · Left atrial cavity size is borderline dilated.  · Right atrial cavity size is dilated.  · Mild mitral valve regurgitation is present  · Moderate tricuspid valve regurgitation is present.  · Estimated right ventricular systolic pressure from tricuspid regurgitation is moderately elevated (45-55 mmHg).

## 2018-01-02 NOTE — CONSULTS
Initial Pulmonary Critical Care Evaluation                   Patient name: Margarita Davila  MRN: [id number]  : 1945  Today's date: 2018      HPI: Patient was admitted in transfer from [facility] for the evaluation of dyspnea and pleural effusions.  She has history of atrial fibrillation.  She apparently was seen in this institution by Dr. Riggins previously.  She's had a history apparently of chronic atrial fibrillation with poor compliance and medications.  She has a past history of significant cigarette abuse smoking roughly 2 packs per day for 60 years quitting 4 years ago.  She has severe chronic obstructive lung disease.  She was hospitalized twice apparently at [facility] in November and December.  She was admitted late in December at [facility].  She was found to have complete opacification of her right chest and had thoracentesis removing 1.5 L.  It appeared to be a transudate with a protein of 1.5.  Cytologies were negative.  She also had bronchoscopy with negative bronchial washing cytologies.  She was transferred here for further evaluation.    The patient is a very vague historian.  She states her breathing is fair at present.  She denies any pain.  She denies any history of TB exposure or occupational exposure.  Her father reportedly  of tuberculosis before she was born.  She denies ever having positive skin test.  He has had some pedal edema but not recently.  She has a history of penicillin allergy.  Her only surgery she says was: Surgery for polyps.    ROS: She states she's had some sinus drainage and congestion.  She complains of cough and dyspnea.  She's had a history of atrial fibrillation.  She had a history of colon polyps.  She denies any problems passing urine.  She has a history of some arthritis.  She has a previous history of stroke and has a history of depression.    PMH:    Past  Medical History:   Diagnosis Date   • Anxiety    • COPD (chronic obstructive pulmonary disease)    • Depression    • Hypertension    • Stroke      History reviewed. No pertinent surgical history.  OB History     No data available        Allergies   Allergen Reactions   • Penicillins      Numbness in nose       MEDS:    Current Facility-Administered Medications:   •  acetaminophen (TYLENOL) tablet 650 mg, 650 mg, Oral, Q6H PRN, Shelby Torres APRN  •  amiodarone (PACERONE) tablet 200 mg, 200 mg, Oral, BID, Shelby Torres APRN, 200 mg at 01/02/18 0803  •  amLODIPine (NORVASC) tablet 5 mg, 5 mg, Oral, Daily, Shelby Torres APRN, 5 mg at 01/02/18 0803  •  aspirin chewable tablet 81 mg, 81 mg, Oral, Daily, Shelby Torres APRN, 81 mg at 01/02/18 0803  •  atorvastatin (LIPITOR) tablet 20 mg, 20 mg, Oral, Nightly, Shelby Torres APRN, 20 mg at 01/01/18 2113  •  digoxin (LANOXIN) tablet 125 mcg, 125 mcg, Oral, Once per day on Sun Tue Thu Sat, Shelby Torres APRN, 125 mcg at 01/02/18 1124  •  folic acid (FOLVITE) tablet 1 mg, 1 mg, Oral, Daily, Shelby Torres APRN, 1 mg at 01/02/18 0803  •  guaiFENesin (MUCINEX) 12 hr tablet 600 mg, 600 mg, Oral, Q12H, Shelby Torres APRN, 600 mg at 01/02/18 0803  •  ipratropium-albuterol (DUO-NEB) nebulizer solution 3 mL, 3 mL, Nebulization, Q4H PRN, Shelby Torres APRN  •  lisinopril (PRINIVIL,ZESTRIL) tablet 2.5 mg, 2.5 mg, Oral, Daily, Shelby Torres APRN, 2.5 mg at 01/02/18 0803  •  melatonin sublingual tablet 5 mg, 5 mg, Sublingual, Nightly PRN, Shelby Torres APRN, 5 mg at 01/01/18 2114  •  metoprolol tartrate (LOPRESSOR) tablet 25 mg, 25 mg, Oral, Q12H, ShelbyLULU Mckeon, 25 mg at 01/01/18 0855  •  pantoprazole (PROTONIX) EC tablet 40 mg, 40 mg, Oral, BID AC, LULU Grajeda, 40 mg at 01/02/18 0803  •  PARoxetine (PAXIL) tablet 20 mg, 20 mg, Oral, QAM, LULU Grajeda, 20 mg at 01/02/18 0803  •  sodium chloride 0.9 % flush 1-10 mL, 1-10 mL,  "Intravenous, PRN, Shelby Torres, APRN  •  thiamine (VITAMIN B-1) tablet 100 mg, 100 mg, Oral, Daily, Shelby Torres, LULU, 100 mg at 01/02/18 0803    FH:  Family History   Problem Relation Age of Onset   • Colon cancer Mother    • Tuberculosis Father        SH:  Social History     Social History   • Marital status:      Spouse name: N/A   • Number of children: N/A   • Years of education: N/A     Occupational History   • Not on file.     Social History Main Topics   • Smoking status: Former Smoker     Packs/day: 2.00     Types: Cigarettes   • Smokeless tobacco: Never Used      Comment: quit 3-4 years ago    • Alcohol use No      Comment: prior history of 6 or more beers daily.    • Drug use: No   • Sexual activity: Defer     Other Topics Concern   • Not on file     Social History Narrative    Currently resides in [facility] with daughter        PE:    BP 94/60 Comment: Reported to nurse  Pulse 102  Temp 98.1 °F (36.7 °C) (Oral)   Resp 16  Ht 170.2 cm (67\")  Wt 63.8 kg (140 lb 11.2 oz)  SpO2 94%  BMI 22.04 kg/m2  HEENT Normocephalic, PERRLA, nose and throat clear  NECK   Neck is supple, no jvd,thyromegaly, or adenopathy  Nodes   No axillary or supraclavicular adenopathy  Back     No CVA or spinal tenderness  Lungs   She has markedly diminished breath sounds bilaterally particularly at the bases.    Cardiac she seems to have an irregularly irregular rhythm without murmurs, gallops, clicks, or rubs   Abdomen: Soft  Extremities: No edema  LAB:    Results from last 7 days  Lab Units 01/02/18  0800   WBC 10*3/mm3 8.01   HEMOGLOBIN g/dL 10.8*   HEMATOCRIT % 33.1*   PLATELETS 10*3/mm3 287       Results from last 7 days  Lab Units 01/02/18  0800   SODIUM mmol/L 137   POTASSIUM mmol/L 5.1   CHLORIDE mmol/L 98*   CO2 mmol/L 35.0*   BUN mg/dL 9   CREATININE mg/dL 0.70   CALCIUM mg/dL 8.2*   BILIRUBIN mg/dL 0.5   ALK PHOS U/L 69   ALT (SGPT) U/L 43*   AST (SGOT) U/L 41*   GLUCOSE mg/dL 78         CXR      cxr " is imprinted with pts name and today's date.  Chest x-ray shows a moderate right effusion rather small left effusion.  A similar to her last x-ray from [facility].    ECHO  His moderate pulmonary hypertension.  She has mild MR and moderate TR    Impression: #1 dyspnea #2 bilateral pleural effusions #3 COPD #4 valvular heart disease #5 atrial fibrillation #6 tobacco abuse #7 history of ethanol abuse.    Plan: The patient presents with the history as noted above.  She has significant effusions but I'm not sure it's increased since her last film at [facility].  If it does return would consider tapping again.  Possibly placing a pigtail drain.  Would make sure we have optimal therapy of her cardiac function.  We will check a BNP.  If questions remain about her pleural space we could consider a VATS procedure.    Thank you for lie mucositis things patient's care.  We'll follow with you.          Chucky Betancourt MD

## 2018-01-02 NOTE — THERAPY EVALUATION
Acute Care - Physical Therapy Initial Evaluation  [facility]     Patient Name: Margarita Davila  : 1945  MRN: [id number]  Today's Date: 2018   Onset of Illness/Injury or Date of Surgery Date: 17  Date of Referral to PT: 17  Referring Physician: LULU Torres      Admit Date: 2017     Visit Dx:    ICD-10-CM ICD-9-CM   1. Impaired functional mobility, balance, gait, and endurance Z74.09 V49.89     Patient Active Problem List   Diagnosis   • Atrial fibrillation   • Medical non-compliance   • History of tobacco abuse   • History of alcohol abuse   • End stage COPD   • Supplemental oxygen dependent   • Recurrent right pleural effusion   • Recurrent left pleural effusion   • Debility   • Hyperlipidemia   • Depression with anxiety   • Hypertension   • Pleural effusion     Past Medical History:   Diagnosis Date   • Anxiety    • COPD (chronic obstructive pulmonary disease)    • Depression    • Hypertension    • Stroke      History reviewed. No pertinent surgical history.       PT ASSESSMENT (last 72 hours)      PT Evaluation       18 0905 17 2220    Rehab Evaluation    Document Type evaluation  -SJ     Subjective Information agree to therapy;complains of;weakness  -SJ     Patient Effort, Rehab Treatment good  -SJ     Symptoms Noted During/After Treatment fatigue  -SJ     General Information    Patient Profile Review yes  -SJ     Onset of Illness/Injury or Date of Surgery Date 17  -SJ     Referring Physician LULU Torres  -     General Observations Pt supine in bed, awake; has tele, O2nc  -SJ     Pertinent History Of Current Problem 72 y.o.F with progressive weakness, SOB; t/f from OSH for HLOC due to atelectasis, collapsed R lung. Has hx of COPD with home O2 use at 2L/min  -SJ     Precautions/Limitations fall precautions;oxygen therapy device and L/min;NPO  -SJ     Prior Level of Function min assist:;transfer;bed mobility;independent:;gait;all household mobility  -SJ     Equipment  Currently Used at Home raised toilet;respiratory supplies;walker, standard;wheelchair  -SJ raised toilet;respiratory supplies;walker, standard;wheelchair  -    Plans/Goals Discussed With patient;agreed upon  -     Risks Reviewed patient:;LOB;increased discomfort;change in vital signs;lines disloged  -SJ     Benefits Reviewed patient:;improve function;increase independence;increase strength;increase balance;increase knowledge  -     Barriers to Rehab medically complex;previous functional deficit  -SJ     Living Environment    Lives With child(wilbreto), adult  -SJ child(wilberto), adult  -CJ    Living Arrangements house  -SJ house  -CJ    Home Accessibility stairs within home;bed not on first floor  -SJ stairs within home;bed not on first floor  -CJ    Stair Railings at Home  inside, present at both sides  -    Type of Financial/Environmental Concern  none  -CJ    Transportation Available  family or friend will provide  -CJ    Clinical Impression    Date of Referral to PT 12/31/17  -SJ     PT Diagnosis impaired mobility  -SJ     Patient/Family Goals Statement get legs stronger  -     Criteria for Skilled Therapeutic Interventions Met yes;treatment indicated  -SJ     Pathology/Pathophysiology Noted (Describe Specifically for Each System) musculoskeletal;pulmonary  -SJ     Impairments Found (describe specific impairments) aerobic capacity/endurance;gait, locomotion, and balance  -     Functional Limitations in Following Categories (Describe Specific Limitations) self-care;home management;community/leisure  -     Rehab Potential good, to achieve stated therapy goals  -SJ     Predicted Duration of Therapy Intervention (days/wks) 2wks  -SJ     Vital Signs    Pre Systolic BP Rehab 100  -SJ     Pre Treatment Diastolic BP 69  -SJ     Posttreatment Heart Rate (beats/min) 93  -SJ     Pain Assessment    Pain Assessment No/denies pain  -SJ     Cognitive Assessment/Intervention    Current Cognitive/Communication Assessment  functional  -SJ     Orientation Status oriented x 4  -SJ     Follows Commands/Answers Questions 100% of the time;able to follow single-step instructions;needs cueing  -SJ     Personal Safety mild impairment;decreased awareness, need for assist;decreased awareness, need for safety  -SJ     Personal Safety Interventions fall prevention program maintained;gait belt;muscle strengthening facilitated;nonskid shoes/slippers when out of bed  -SJ     ROM (Range of Motion)    General ROM no range of motion deficits identified  -SJ     MMT (Manual Muscle Testing)    General MMT Assessment lower extremity strength deficits identified  -SJ     General MMT Assessment Detail BLE's 4-/5  -SJ     Bed Mobility, Assessment/Treatment    Bed Mobility, Assistive Device head of bed elevated;bed rails  -SJ     Bed Mob, Supine to Sit, Lily minimum assist (75% patient effort)  -SJ     Bed Mobility, Safety Issues decreased use of arms for pushing/pulling;decreased use of legs for bridging/pushing  -SJ     Bed Mobility, Impairments strength decreased;impaired balance  -SJ     Bed Mobility, Comment assist req to raise trunk up into sitting  -SJ     Transfer Assessment/Treatment    Transfers, Sit-Stand Lily minimum assist (75% patient effort);2 person assist required;verbal cues required  -SJ     Transfers, Stand-Sit Lily minimum assist (75% patient effort);verbal cues required  -SJ     Transfers, Sit-Stand-Sit, Assist Device rolling walker  -SJ     Toilet Transfer, Lily maximum assist (25% patient effort);2 person assist required;verbal cues required  -SJ     Toilet Transfer, Assistive Device rolling walker;other (see comments)   grab bars  -SJ     Transfer, Safety Issues sequencing ability decreased;weight-shifting ability decreased  -SJ     Transfer, Impairments strength decreased;impaired balance  -SJ     Transfer, Comment Pt had increased difficulty with toilet t/f due to low surface  -SJ     Gait  Assessment/Treatment    Gait, [place] Level minimum assist (75% patient effort);2 person assist required;verbal cues required  -     Gait, Assistive Device rolling walker  -     Gait, Distance (Feet) 42  -     Gait, Gait Pattern Analysis swing-through gait  -     Gait, Gait Deviations john decreased;decreased heel strike;double stance time increased;forward flexed posture  -     Gait, Safety Issues step length decreased;supplemental O2  -     Gait, Impairments strength decreased;impaired balance  -     Gait, Comment Pt fatigues quickly  -     Motor Skills/Interventions    Additional Documentation Balance Skills Training (Group)  -     Balance Skills Training    Sitting-Level of Assistance Close supervision  -     Sitting-Balance Support Right upper extremity supported;Left upper extremity supported;Feet supported  -     Standing-Level of Assistance Contact guard;x2  -SJ     Static Standing Balance Support assistive device  -     Gait Balance-Level of Assistance Minimum assistance;x2  -SJ     Gait Balance Support assistive device  -     Positioning and Restraints    Pre-Treatment Position in bed  -     Post Treatment Position chair  -     In Chair reclined;call light within reach;encouraged to call for assist;exit alarm on;heels elevated  -       User Key  (r) = Recorded By, (t) = Taken By, (c) = Cosigned By    Initials Name Provider Type    KASSIE Hilton PT Physical Therapist    CJ Ami Harris, RN Registered Nurse          Physical Therapy Education     Title: PT OT SLP Therapies (Active)     Topic: Physical Therapy (Active)     Point: Mobility training (Active)    Learning Progress Summary    Learner Readiness Method Response Comment Documented by Status   Patient Acceptance E NR  SJ 01/02/18 0956 Active               Point: Home exercise program (Active)    Learning Progress Summary    Learner Readiness Method Response Comment Documented by Status   Patient  Acceptance E NR   01/02/18 0956 Active               Point: Body mechanics (Active)    Learning Progress Summary    Learner Readiness Method Response Comment Documented by Status   Patient Acceptance E NR   01/02/18 0956 Active                      User Key     Initials Effective Dates Name Provider Type Discipline     06/19/15 -  Aaliyah Hilton, PT Physical Therapist PT                PT Recommendation and Plan  Anticipated Discharge Disposition: inpatient rehabilitation facility  Planned Therapy Interventions: balance training, bed mobility training, gait training, home exercise program, patient/family education, strengthening, transfer training  PT Frequency: daily  Plan of Care Review  Plan Of Care Reviewed With: patient  Progress: progress toward functional goals as expected  Outcome Summary/Follow up Plan: PT eval completed. Pt presents with decreased activity adalberto, generalized weakness, and decreased independence with mobility. Pt req Margareth x2 for sit to stand from EOB, maxAx2 for toilet t/f due to low surface; amb 41 feet with RW and Margareth x2. PT recommends d/c to acute rehab.          IP PT Goals       01/02/18 0953          Transfer Training PT LTG    Transfer Training PT LTG, Date Established 01/02/18  -SJ      Transfer Training PT LTG, Time to Achieve 2 wks  -SJ      Transfer Training PT LTG, Activity Type bed to chair /chair to bed;sit to stand/stand to sit  -SJ      Transfer Training PT LTG, [place] Level supervision required  -SJ      Transfer Training PT LTG, Assist Device walker, rolling  -SJ      Transfer Training PT LTG, Outcome goal ongoing  -SJ      Gait Training PT LTG    Gait Training Goal PT LTG, Date Established 01/02/18  -SJ      Gait Training Goal PT LTG, Time to Achieve 2 wks  -SJ      Gait Training Goal PT LTG, [place] Level conditional independence  -SJ      Gait Training Goal PT LTG, Assist Device walker, rolling  -SJ      Gait Training Goal PT LTG, Distance to Achieve  150  -SJ      Gait Training Goal PT LTG, Outcome goal ongoing  -SJ      Static Standing Balance PT LTG    Static Standing Balance PT LTG, Date Established 01/02/18  -SJ      Static Standing Balance PT LTG, Time to Achieve 2 wks  -SJ      Static Standing Balance PT LTG, [place] Level conditional independence  -SJ      Static Standing Balance PT LTG, Assist Device assistive Device  -SJ      Static Standing Balance PT LTG, Outcome goal ongoing  -SJ        User Key  (r) = Recorded By, (t) = Taken By, (c) = Cosigned By    Initials Name Provider Type    KASSIE Hilton PT Physical Therapist                Outcome Measures       01/02/18 0905          How much help from another person do you currently need...    Turning from your back to your side while in flat bed without using bedrails? 3  -SJ      Moving from lying on back to sitting on the side of a flat bed without bedrails? 3  -SJ      Moving to and from a bed to a chair (including a wheelchair)? 3  -SJ      Standing up from a chair using your arms (e.g., wheelchair, bedside chair)? 2  -SJ      Climbing 3-5 steps with a railing? 2  -SJ      To walk in hospital room? 3  -SJ      AM-PAC 6 Clicks Score 16  -SJ      Functional Assessment    Outcome Measure Options AM-PAC 6 Clicks Basic Mobility (PT)  -        User Key  (r) = Recorded By, (t) = Taken By, (c) = Cosigned By    Initials Name Provider Type    AKSSIE Hilton PT Physical Therapist           Time Calculation:         PT Charges       01/02/18 0957          Time Calculation    Start Time 0905  -SJ      PT Received On 01/02/18  -      PT Goal Re-Cert Due Date 01/12/18  -        User Key  (r) = Recorded By, (t) = Taken By, (c) = Cosigned By    Initials Name Provider Type    KASSIE Hilton PT Physical Therapist          Therapy Charges for Today     Code Description Service Date Service Provider Modifiers Qty    [id number]  PT EVAL MOD COMPLEXITY 4 1/2/2018 Aaliyah Hilton PT GP 1           PT G-Codes  Outcome Measure Options: AM-PAC 6 Clicks Basic Mobility (PT)      Aaliyah Hilton, PT  1/2/2018

## 2018-01-02 NOTE — PLAN OF CARE
Problem: Patient Care Overview (Adult)  Goal: Plan of Care Review  Outcome: Ongoing (interventions implemented as appropriate)   01/02/18 1126   Coping/Psychosocial Response Interventions   Plan Of Care Reviewed With patient   Patient Care Overview   Progress progress toward functional goals as expected     Goal: Adult Individualization and Mutuality  Outcome: Ongoing (interventions implemented as appropriate)    Goal: Discharge Needs Assessment  Outcome: Ongoing (interventions implemented as appropriate)   01/02/18 1356   Discharge Needs Assessment   Concerns To Be Addressed no discharge needs identified   Equipment Needed After Discharge none   Discharge Facility/Level Of Care Needs rehabilitation facility   Current Health   Anticipated Changes Related to Illness none   Living Environment   Transportation Available car;family or friend will provide   Self-Care   Equipment Currently Used at Home oxygen;walker, rolling;wheelchair;commode       Problem: Fall Risk (Adult)  Goal: Identify Related Risk Factors and Signs and Symptoms  Outcome: Ongoing (interventions implemented as appropriate)   01/01/18 0236   Fall Risk   Fall Risk: Related Risk Factors fatigue/slow reaction;gait/mobility problems;environment unfamiliar   Fall Risk: Signs and Symptoms presence of risk factors     Goal: Absence of Falls  Outcome: Ongoing (interventions implemented as appropriate)   01/02/18 1540   Fall Risk (Adult)   Absence of Falls making progress toward outcome       Problem: Respiratory Insufficiency (Adult)  Goal: Identify Related Risk Factors and Signs and Symptoms  Outcome: Ongoing (interventions implemented as appropriate)   01/01/18 0236   Respiratory Insufficiency   Related Risk Factors (Respiratory Insufficiency) activity intolerance;motivation lacking   Signs and Symptoms (Respiratory Insufficiency) cough (productive/ineffective);decreased oxygen saturation;shortness of breath     Goal: Acid/Base Balance  Outcome: Ongoing  (interventions implemented as appropriate)   01/02/18 1540   Respiratory Insufficiency (Adult)   Acid/Base Balance making progress toward outcome     Goal: Effective Ventilation  Outcome: Ongoing (interventions implemented as appropriate)   01/02/18 1540   Respiratory Insufficiency (Adult)   Effective Ventilation making progress toward outcome       Problem: Activity Intolerance (Adult)  Goal: Identify Related Risk Factors and Signs and Symptoms  Outcome: Ongoing (interventions implemented as appropriate)   01/02/18 0326   Activity Intolerance   Activity Intolerance: Related Risk Factors deconditioned state;functional decline;generalized weakness;impaired pulmonary function;O2 supply/demand imbalance;sedentary lifestyle   Signs and Symptoms (Activity Intolerance) dyspnea/shortness of breath     Goal: Activity Tolerance  Outcome: Ongoing (interventions implemented as appropriate)   01/02/18 1540   Activity Intolerance (Adult)   Activity Tolerance making progress toward outcome     Goal: Effective Energy Conservation Techniques  Outcome: Ongoing (interventions implemented as appropriate)   01/02/18 1540   Activity Intolerance (Adult)   Effective Energy Conservation Techniques making progress toward outcome

## 2018-01-02 NOTE — PROGRESS NOTES
[facility] Medicine Services  PROGRESS NOTE    Patient Name: Margarita Davila  : 1945  MRN: [id number]    Date of Admission: 2017  Length of Stay: 2  Primary Care Physician: No primary care provider on file.    Subjective   Subjective     CC:  F/u pleff    HPI:  Patient doing well when seen late this morning.  Denies significant dyspnea or cough.  No pleurisy.  No abdominal pain, nausea, or vomiting. Reports poor appetite at baseline.  She is on her baseline of 2-3L NC when seen.     Review of Systems  Gen- No fevers, chills  CV- No chest pain, palpitations  Resp- No cough, dyspnea  GI- No N/V/D, abd pain    Otherwise ROS is negative except as mentioned in the HPI.    Objective   Objective     Vital Signs:   Temp:  [97.5 °F (36.4 °C)-98.3 °F (36.8 °C)] 98 °F (36.7 °C)  Heart Rate:  [] 83  Resp:  [14-16] 16  BP: ()/(60-94) 93/62     Physical Exam: Stable  Constitutional: No acute distress, awake, alert  HENT: NCAT, mucous membranes moist  Respiratory: Decreased breath sounds right greater than left base, respiratory effort normal   Cardiovascular: RRR, no murmurs, rubs, or gallops, palpable pedal pulses bilaterally  Gastrointestinal: Positive bowel sounds, soft, nontender, nondistended  Musculoskeletal: No bilateral ankle edema  Psychiatric: Appropriate affect, cooperative  Neurologic: Oriented x 3, strength symmetric in all extremities, Cranial Nerves grossly intact to confrontation, speech clear  Skin: No rashes    Results Reviewed:  I have personally reviewed current lab, radiology, and data and agree.      Results from last 7 days  Lab Units 18  0800 18  0548 17  2225   WBC 10*3/mm3 8.01 8.50 9.52   HEMOGLOBIN g/dL 10.8* 9.8* 9.9*   HEMATOCRIT % 33.1* 29.6* 30.1*   PLATELETS 10*3/mm3 287 294 286   INR   --  0.94  --        Results from last 7 days  Lab Units 18  0800 18  0548 17  2225   SODIUM mmol/L 137 135 135   POTASSIUM mmol/L  5.1 4.4 4.0   CHLORIDE mmol/L 98* 95* 96*   CO2 mmol/L 35.0* 32.0* 30.0   BUN mg/dL 9 14 15   CREATININE mg/dL 0.70 0.70 0.70   GLUCOSE mg/dL 78 79 107*   CALCIUM mg/dL 8.2* 8.1* 7.9*   ALT (SGPT) U/L 43*  --  38   AST (SGOT) U/L 41*  --  28     No results found for: BNP  No results found for: PHART    Microbiology Results Abnormal     None        Imaging Results (last 24 hours)     Procedure Component Value Units Date/Time    US Liver [[id number]] Collected:  01/02/18 0936     Updated:  01/02/18 1153    Narrative:       EXAMINATION: US LIVER-01/02/2018:     INDICATION: Evaluate for chronic liver disease.         TECHNIQUE: Ultrasound of the right upper quadrant was performed in the  longitudinal and transverse planes.     COMPARISON: NONE.     FINDINGS: The pancreas is normal. The liver reveals no focal abnormality  or biliary ductal dilatation. The gallbladder demonstrates no wall  thickening or stones. The common bile duct measures 6 mm in AP dimension  with no evidence of common duct stone. The right kidney measures 10.1 cm  in length with no mass, stone or obstruction. There is a right pleural  effusion.       Impression:       Normal examination. There are no abnormality regarding the  pancreas, liver, gallbladder or right kidney.     D:  01/02/2018  E:  01/02/2018            This report was finalized on 1/2/2018 11:51 AM by Dr. Bertin Church MD.           Results for orders placed during the hospital encounter of 12/31/17   Adult Transthoracic Echo Complete W/ Cont if Necessary Per Protocol    Narrative · Left ventricular systolic function is normal. Estimated EF = 55%.  · Left atrial cavity size is borderline dilated.  · Right atrial cavity size is dilated.  · Mild mitral valve regurgitation is present  · Moderate tricuspid valve regurgitation is present.  · Estimated right ventricular systolic pressure from tricuspid   regurgitation is moderately elevated (45-55 mmHg).          I have reviewed the  medications.    Assessment/Plan   Assessment / Plan     Hospital Problem List     * (Principal)Recurrent right pleural effusion    Atrial fibrillation    Medical non-compliance    History of tobacco abuse    History of alcohol abuse    End stage COPD    Supplemental oxygen dependent    Recurrent left pleural effusion    Debility    Hyperlipidemia    Depression with anxiety    Hypertension    Pleural effusion           Brief Hospital Course to date:  Margarita Davila is a 72 y.o. female with a past medical history of hypertension, hyperlipidemia, end-stage COPD, atrial fibrillation, tobacco abuse, and alcohol abuse who was transferred to our facility on 12/31 from [facility] where she initially was admitted on 12/19.  Outside evaluation there disclosed complete opacification of the right hemithorax with evidence of a large pleural effusion on CT scan.  CT thoracentesis on 12/21 drained 1.5 L fluid and pt had a bronchoscopy with negative path per report (data deficient on presence of endobronchial lesion). Pt transferred here for higher level of care:    Assessment & Plan:    Recurrent right pleural effusion, transudative status post thoracentesis of 1.5 L on 12/21  Small to moderate left pleural effusion  - Need outside bronchoscopy report (checked this AM and not available yet, d/w RN)  - Repeat CXR here with small to moderate right pleural effusion and small on left  - Appreciate pulmonology assistance, will order repeat CXR in AM to eval size of effusion  - Cause of pt's transudative effusion is likely predominantly due to hypoalbuminemia and with a possible cardiac component given moderate TR and elevated RVSP  - Add nutritional supplements   - Check BNP, if elevated, will attempt trial of diuresis  - No evidence of pneumonia, cirrhosis/liver disease, or pericarditis      End-stage COPD, per report  Chronic nocturnal hypoxia, 2LNC at bedtime at home  - Supplemental O2, prn nebs, mucinex    Chronic  primary hypertension  - BP low this afternoon, will dc lisinopril and amlodipine and monitor, pt asymptomatic    Weakness/debility  - PT/OT    Chronic Atrial Fibrillation, ChadsVasc 5  - Continue metoprolol   - Not chronically anticoagulated due to medication noncompliance per report  - Echo reviewed as above    Medical Noncompliance     Normocytic Anemia   - stable    Hyperlipidemia    Hx of Tobacco Abuse     Remote Alcohol Use     DVT Prophylaxis: mechanical given possible need for procedure   CODE STATUS: Full Code  Disposition: I expect the patient to be discharged home (?) in ~2 days depending on evaluation of pleff.    Musa Carson MD  01/02/18  3:54 PM

## 2018-01-03 ENCOUNTER — APPOINTMENT (OUTPATIENT)
Dept: GENERAL RADIOLOGY | Facility: HOSPITAL | Age: 73
End: 2018-01-03

## 2018-01-03 LAB
ERYTHROCYTE [SEDIMENTATION RATE] IN BLOOD: 4 MM/HR (ref 0–30)
RHEUMATOID FACT SERPL-ACNC: POSITIVE [IU]/ML
RHEUMATOID FACT TITR SER: NORMAL IU/ML

## 2018-01-03 PROCEDURE — 99232 SBSQ HOSP IP/OBS MODERATE 35: CPT | Performed by: INTERNAL MEDICINE

## 2018-01-03 PROCEDURE — 71046 X-RAY EXAM CHEST 2 VIEWS: CPT

## 2018-01-03 PROCEDURE — 99232 SBSQ HOSP IP/OBS MODERATE 35: CPT | Performed by: HOSPITALIST

## 2018-01-03 PROCEDURE — 86038 ANTINUCLEAR ANTIBODIES: CPT | Performed by: INTERNAL MEDICINE

## 2018-01-03 PROCEDURE — 85652 RBC SED RATE AUTOMATED: CPT | Performed by: INTERNAL MEDICINE

## 2018-01-03 PROCEDURE — 97116 GAIT TRAINING THERAPY: CPT

## 2018-01-03 PROCEDURE — 86431 RHEUMATOID FACTOR QUANT: CPT | Performed by: INTERNAL MEDICINE

## 2018-01-03 PROCEDURE — 97110 THERAPEUTIC EXERCISES: CPT

## 2018-01-03 RX ORDER — FUROSEMIDE 40 MG/1
40 TABLET ORAL DAILY
Status: DISCONTINUED | OUTPATIENT
Start: 2018-01-03 | End: 2018-01-05 | Stop reason: HOSPADM

## 2018-01-03 RX ADMIN — GUAIFENESIN 600 MG: 600 TABLET, EXTENDED RELEASE ORAL at 09:03

## 2018-01-03 RX ADMIN — PANTOPRAZOLE SODIUM 40 MG: 40 TABLET, DELAYED RELEASE ORAL at 09:03

## 2018-01-03 RX ADMIN — Medication 5 MG: at 20:28

## 2018-01-03 RX ADMIN — ATORVASTATIN CALCIUM 20 MG: 20 TABLET, FILM COATED ORAL at 20:28

## 2018-01-03 RX ADMIN — GUAIFENESIN 600 MG: 600 TABLET, EXTENDED RELEASE ORAL at 20:28

## 2018-01-03 RX ADMIN — AMIODARONE HYDROCHLORIDE 200 MG: 200 TABLET ORAL at 20:28

## 2018-01-03 RX ADMIN — Medication 100 MG: at 09:03

## 2018-01-03 RX ADMIN — FOLIC ACID 1 MG: 1 TABLET ORAL at 09:03

## 2018-01-03 RX ADMIN — ASPIRIN 81 MG 81 MG: 81 TABLET ORAL at 09:03

## 2018-01-03 RX ADMIN — PANTOPRAZOLE SODIUM 40 MG: 40 TABLET, DELAYED RELEASE ORAL at 17:45

## 2018-01-03 RX ADMIN — METOPROLOL TARTRATE 25 MG: 25 TABLET ORAL at 09:03

## 2018-01-03 RX ADMIN — PAROXETINE HYDROCHLORIDE 20 MG: 20 TABLET, FILM COATED ORAL at 09:03

## 2018-01-03 RX ADMIN — AMIODARONE HYDROCHLORIDE 200 MG: 200 TABLET ORAL at 09:03

## 2018-01-03 RX ADMIN — FUROSEMIDE 40 MG: 40 TABLET ORAL at 09:03

## 2018-01-03 NOTE — PLAN OF CARE
Problem: Fall Risk (Adult)  Goal: Identify Related Risk Factors and Signs and Symptoms  Outcome: Ongoing (interventions implemented as appropriate)      Problem: Respiratory Insufficiency (Adult)  Goal: Identify Related Risk Factors and Signs and Symptoms  Outcome: Ongoing (interventions implemented as appropriate)   01/01/18 0236   Respiratory Insufficiency   Related Risk Factors (Respiratory Insufficiency) activity intolerance;motivation lacking   Signs and Symptoms (Respiratory Insufficiency) cough (productive/ineffective);decreased oxygen saturation;shortness of breath       Problem: Activity Intolerance (Adult)  Goal: Identify Related Risk Factors and Signs and Symptoms  Outcome: Ongoing (interventions implemented as appropriate)   01/02/18 0326   Activity Intolerance   Activity Intolerance: Related Risk Factors deconditioned state;functional decline;generalized weakness;impaired pulmonary function;O2 supply/demand imbalance;sedentary lifestyle   Signs and Symptoms (Activity Intolerance) dyspnea/shortness of breath

## 2018-01-03 NOTE — PROGRESS NOTES
[facility] Medicine Services  PROGRESS NOTE    Patient Name: Margarita Davila  : 1945  MRN: [id number]    Date of Admission: 2017  Length of Stay: 3  Primary Care Physician: No primary care provider on file.    Subjective   Subjective     CC: F/u pleff    HPI:  Pt feels slightly more dyspneic than yesterday with no pleurisy or significant cough. Notes fatigue and generalized weakness. No fevers or chills.     Review of Systems  Gen- No fevers, chills  CV- No chest pain, palpitations  Resp- As above  GI- No N/V/D, abd pain    Otherwise ROS is negative except as mentioned in the HPI.    Objective   Objective     Vital Signs:   Temp:  [97.5 °F (36.4 °C)-98.7 °F (37.1 °C)] 97.8 °F (36.6 °C)  Heart Rate:  [83-94] 84  Resp:  [16-20] 18  BP: ()/(62-82) 92/66     Physical Exam: Stable  Constitutional: No acute distress, awake, alert  HENT: NCAT, mucous membranes moist  Respiratory: Decreased breath sounds right greater than left base, respiratory effort normal, exam stable from yesterday   Cardiovascular: RRR, no murmurs, rubs, or gallops, palpable pedal pulses bilaterally  Gastrointestinal: Positive bowel sounds, soft, nontender, nondistended  Musculoskeletal: No bilateral ankle edema  Psychiatric: Appropriate affect, cooperative  Neurologic: Oriented x 3, strength symmetric in all extremities, Cranial Nerves grossly intact to confrontation, speech clear  Skin: No rashes    Results Reviewed:  I have personally reviewed current lab, radiology, and data and agree.      Results from last 7 days  Lab Units 18  0800 18  0548 17  2225   WBC 10*3/mm3 8.01 8.50 9.52   HEMOGLOBIN g/dL 10.8* 9.8* 9.9*   HEMATOCRIT % 33.1* 29.6* 30.1*   PLATELETS 10*3/mm3 287 294 286   INR   --  0.94  --        Results from last 7 days  Lab Units 18  0800 18  0548 17  2225   SODIUM mmol/L 137 135 135   POTASSIUM mmol/L 5.1 4.4 4.0   CHLORIDE mmol/L 98* 95* 96*   CO2 mmol/L  35.0* 32.0* 30.0   BUN mg/dL 9 14 15   CREATININE mg/dL 0.70 0.70 0.70   GLUCOSE mg/dL 78 79 107*   CALCIUM mg/dL 8.2* 8.1* 7.9*   ALT (SGPT) U/L 43*  --  38   AST (SGOT) U/L 41*  --  28     BNP   Date Value Ref Range Status   01/02/2018 291.0 (H) 0.0 - 100.0 pg/mL Final     Microbiology Results Abnormal     None        Imaging Results (last 24 hours)     Procedure Component Value Units Date/Time    XR Chest PA & Lateral [[id number]] Collected:  01/03/18 1129     Updated:  01/03/18 1227    Narrative:       EXAMINATION: XR CHEST, PA AND LATERAL-01/03/2018:      INDICATION: Followup pleffs; Z74.09-Other reduced mobility; Z74.09-Other  reduced mobility.      COMPARISON: Chest x-ray 01/01/2018.     FINDINGS: Cardiac size enlarged with trace pulmonary vascular  congestion. Persistent lateral pleural effusions, right greater than  left, with mild decrease in prominence and improved aeration of the  right lung base which may represent some component of layering. No new  focal airspace opacity.        Impression:       Slightly improved aeration of the right lung base may  represent increased layering component of bilateral pleural effusions  which are similar to prior. No new parenchymal disease.     D:  01/03/2018  E:  01/03/2018                 Results for orders placed during the hospital encounter of 12/31/17   Adult Transthoracic Echo Complete W/ Cont if Necessary Per Protocol    Narrative · Left ventricular systolic function is normal. Estimated EF = 55%.  · Left atrial cavity size is borderline dilated.  · Right atrial cavity size is dilated.  · Mild mitral valve regurgitation is present  · Moderate tricuspid valve regurgitation is present.  · Estimated right ventricular systolic pressure from tricuspid   regurgitation is moderately elevated (45-55 mmHg).          I have reviewed the medications.    Assessment/Plan   Assessment / Plan     Hospital Problem List     * (Principal)Recurrent right pleural effusion     Atrial fibrillation    Medical non-compliance    History of tobacco abuse    History of alcohol abuse    End stage COPD    Supplemental oxygen dependent    Recurrent left pleural effusion    Debility    Hyperlipidemia    Depression with anxiety    Hypertension    Pleural effusion           Brief Hospital Course to date:  Margarita Davila is a 72 y.o. female with a past medical history of hypertension, hyperlipidemia, end-stage COPD, atrial fibrillation, tobacco abuse, and alcohol abuse who was transferred to our facility on 12/31 from [facility] where she initially was admitted on 12/19.  Outside evaluation there disclosed complete opacification of the right hemithorax with evidence of a large pleural effusion on CT scan.  CT thoracentesis on 12/21 drained 1.5 L fluid and pt had a bronchoscopy with negative path per report (data deficient on presence of endobronchial lesion). Pt transferred here for higher level of care:    Assessment & Plan:    Recurrent right pleural effusion, transudative status post thoracentesis of 1.5 L on 12/21  Small to moderate left pleural effusion  - Repeat CXR today personally reviewed, pt with persistent bilateral effusions  - Reviewed outside records, procedure note from bronch still not available, BAL cytology negative, no report of endobronchial lesion  - Appreciate Pulm assistance, RF +, ESR negative    - Appreciate pulmonology assistance, ? Repeat tap, ? CTS eval   - Cause of pt's transudative effusion is likely predominantly due to hypoalbuminemia and with a possible cardiac component given moderate TR and elevated RVSP  - Ordered nutritional supplements   - Added PO lasix due to mildly elevated BNP   - No evidence of pneumonia, cirrhosis/liver disease, or pericarditis      End-stage COPD, per report  Chronic nocturnal hypoxia, 2LNC at bedtime at home  - Supplemental O2, prn nebs, mucinex    Chronic primary hypertension  - BP low, dc'ed lisinopril and amlodipine and  monitoring, pt asymptomatic    Rheumatoid Factor Positive  - unclear significance     Weakness/debility  - PT/OT    Chronic Atrial Fibrillation, ChadsVasc 5  - Continue metoprolol at low dose  - Not chronically anticoagulated due to medication noncompliance per report  - Echo reviewed as above    Medical Noncompliance     Normocytic Anemia   - stable    Hyperlipidemia    Hx of Tobacco Abuse     Remote Alcohol Use     DVT Prophylaxis: mechanical given possible need for procedure   CODE STATUS: Full Code  Disposition: I expect the patient to be discharged to rehab in ~ 2-3 days.     Musa Carson MD  01/03/18  12:34 PM

## 2018-01-03 NOTE — PROGRESS NOTES
INPATIENT PULMONARY SERVICE  PROGRESS NOTE     Hospital LOS: 3 days    Ms. Margarita Davila, is followed for a Chief Complaint of: Pleural Effusion    Principal Problem:    Recurrent right pleural effusion  Active Problems:    Atrial fibrillation    Medical non-compliance    History of tobacco abuse    History of alcohol abuse    End stage COPD    Supplemental oxygen dependent    Recurrent left pleural effusion    Debility    Hyperlipidemia    Depression with anxiety    Hypertension    Pleural effusion      Subjective   S   Ms. Davila is a 73yo F who was transferred from [facility] for further evaluation of dyspnea and pleural effusions. She has severe COPD and was hospitalized twice at [place] in November and December. She was admitted again in December to [facility] where she was found to have complete opacification of her right chest. She had a right sided thoracentesis with 1.5L of fluid removed. This was consistent with a transudate. Cytology was negative as well. She was transferred here for further evaluation.       Interval History:  She reports that her breathing is stable this AM. She continues to have a cough and dyspnea.        The patient's relevant past medical, surgical and social history were reviewed and updated in Epic as appropriate.      ROS:   Constitutional: Negative for fever.   Respiratory: Positive for dyspnea.   Cardiovascular: Negative for chest pain.   Gastrointestinal: Negative for  nausea, vomiting and diarrhea.     Objective   O     Vitals  Temp  Min: 97.5 °F (36.4 °C)  Max: 98.7 °F (37.1 °C)  BP  Min: 89/62  Max: 121/82  Pulse  Min: 83  Max: 94  Resp  Min: 16  Max: 20  SpO2  Min: 96 %  Max: 100 % Flow (L/min)  Min: 2  Max: 3    I/O 24 HR (7:00 AM-6:59AM):  Intake/Output       01/02/18 0700 - 01/03/18 0659 01/03/18 0700 - 01/04/18 0659    Intake (ml) 480 480    Output (ml) 400 625    Net (ml) 80 -145    Last Weight 63 kg (139 lb) --          Medications (Drips):        Physical Examination    Telemetry:    Atrial Rhythm: atrial fibrillation      Constitutional:  No acute distress.  Conversant.   Cardiovascular: Regular rate and rhythm.  No murmurs, rub or gallop.   Respiratory: Normal symmetric chest expansion.  Normal respiratory effort.  Diminished at the right base.    Abdominal:  Soft. No masses.   Non-tender. No distension.   No hepatosplenomegaly.   Extremities: No digital cyanosis or clubbing.  Trace peripheral edema.   Neurological:   Alert and Oriented to person, place, and time.   Moves all extremities.           Results from last 7 days  Lab Units 01/02/18  0800 01/01/18  0548 12/31/17  2225   WBC 10*3/mm3 8.01 8.50 9.52   HEMOGLOBIN g/dL 10.8* 9.8* 9.9*   MCV fL 95.9 94.9 95.3   PLATELETS 10*3/mm3 287 294 286       Results from last 7 days  Lab Units 01/02/18  0800 01/01/18  0548 12/31/17  2225   SODIUM mmol/L 137 135 135   POTASSIUM mmol/L 5.1 4.4 4.0   CO2 mmol/L 35.0* 32.0* 30.0   CREATININE mg/dL 0.70 0.70 0.70     CREATININE: 0.7 mg/dL (01/02/18 0800)  Estimated creatinine clearance: 62.4 mL/min    Results from last 7 days  Lab Units 01/02/18  0800 12/31/17  2225   ALK PHOS U/L 69 85   BILIRUBIN mg/dL 0.5 0.2*   ALT (SGPT) U/L 43* 38   AST (SGOT) U/L 41* 28             Images:   CXR  Personally reviewed.      Imaging Results (last 24 hours)     Procedure Component Value Units Date/Time    XR Chest PA & Lateral [[id number]] Collected:  01/03/18 1129     Updated:  01/03/18 1309    Narrative:       EXAMINATION: XR CHEST, PA AND LATERAL-01/03/2018:      INDICATION: Followup pleffs; Z74.09-Other reduced mobility; Z74.09-Other  reduced mobility.      COMPARISON: Chest x-ray 01/01/2018.     FINDINGS: Cardiac size enlarged with trace pulmonary vascular  congestion. Persistent lateral pleural effusions, right greater than  left, with mild decrease in prominence and improved aeration of the  right lung base which may represent some component of layering. No new  focal  airspace opacity.        Impression:       Slightly improved aeration of the right lung base may  represent increased layering component of bilateral pleural effusions  which are similar to prior. No new parenchymal disease.     D:  01/03/2018  E:  01/03/2018     This report was finalized on 1/3/2018 1:07 PM by Dr. Gil Tran.             I reviewed the patient's new clinical results.  I reviewed the patient's new imaging results and agree with the interpretation.    Assessment/Plan   A / P     Ms. Davila is a 71yo F with a history of severe COPD who was transferred for bilateral pleural effusions. The right effusion was recently tapped with 1.5L of fluid removed which was consistent with a transudate. She has a positive rheumatoid factor but this would not give you a transudative effusion. Her effusions are bilateral and are likely secondary to poor nutritional status in the setting of likely diastolic heart failure. The significance of her positive rheumatoid factor is unclear and she will need follow up with a Rheumatologist for further evaluation after discharge.     Diet Regular  Full Code    Hospital Problem List     * (Principal)Recurrent right pleural effusion    Atrial fibrillation    Medical non-compliance    History of tobacco abuse    History of alcohol abuse    End stage COPD    Supplemental oxygen dependent    Recurrent left pleural effusion    Debility    Hyperlipidemia    Depression with anxiety    Hypertension    Pleural effusion          Assessment / Plan:  1. Diuresis as tolerated.   2. Optimize nutritional status  3. No need for repeat thoracentesis currently. Continue to monitor.   4. Continue IS and ambulation as tolerated.   5. CXR in AM    I discussed the patient's findings and my recommendations with patient and family    Time:  I spent 25 minutes, face to face, with the patient and family or on the castillo coordinating care with other health care providers.   I spent > 50% percent of this  time, counseling and discussing diagnosis and management .     Ene V Case, DO  Pulmonary and Critical Care Medicine

## 2018-01-03 NOTE — PLAN OF CARE
Problem: Patient Care Overview (Adult)  Goal: Plan of Care Review  Outcome: Ongoing (interventions implemented as appropriate)    Goal: Adult Individualization and Mutuality  Outcome: Ongoing (interventions implemented as appropriate)    Goal: Discharge Needs Assessment  Outcome: Ongoing (interventions implemented as appropriate)      Problem: Fall Risk (Adult)  Goal: Identify Related Risk Factors and Signs and Symptoms  Outcome: Ongoing (interventions implemented as appropriate)    Goal: Absence of Falls  Outcome: Ongoing (interventions implemented as appropriate)      Problem: Respiratory Insufficiency (Adult)  Goal: Identify Related Risk Factors and Signs and Symptoms  Outcome: Ongoing (interventions implemented as appropriate)    Goal: Acid/Base Balance  Outcome: Ongoing (interventions implemented as appropriate)    Goal: Effective Ventilation  Outcome: Ongoing (interventions implemented as appropriate)      Problem: Activity Intolerance (Adult)  Goal: Identify Related Risk Factors and Signs and Symptoms  Outcome: Ongoing (interventions implemented as appropriate)    Goal: Activity Tolerance  Outcome: Ongoing (interventions implemented as appropriate)

## 2018-01-03 NOTE — PROGRESS NOTES
Continued Stay Note  [facility]     Patient Name: Margarita Davila  MRN: [id number]  Today's Date: 1/3/2018    Admit Date: 12/31/2017          Discharge Plan       01/03/18 1337    Case Management/Social Work Plan    Additional Comments SW heard back from [facility] and they are able to offer a bed to pt when medically ready. SW updated pt and pt's daughter at bedside. Pt's daughter reports she can provide transportation to [facility] when pt ready for discharge.               Discharge Codes     None        Expected Discharge Date and Time     Expected Discharge Date Expected Discharge Time    Jan 4, 2018             LATRICIA Herrera

## 2018-01-04 ENCOUNTER — APPOINTMENT (OUTPATIENT)
Dept: GENERAL RADIOLOGY | Facility: HOSPITAL | Age: 73
End: 2018-01-04

## 2018-01-04 LAB — ANA SER QL IA: NEGATIVE

## 2018-01-04 PROCEDURE — 97116 GAIT TRAINING THERAPY: CPT | Performed by: PHYSICAL THERAPIST

## 2018-01-04 PROCEDURE — 99231 SBSQ HOSP IP/OBS SF/LOW 25: CPT | Performed by: INTERNAL MEDICINE

## 2018-01-04 PROCEDURE — 97110 THERAPEUTIC EXERCISES: CPT | Performed by: PHYSICAL THERAPIST

## 2018-01-04 PROCEDURE — 99232 SBSQ HOSP IP/OBS MODERATE 35: CPT | Performed by: PHYSICIAN ASSISTANT

## 2018-01-04 PROCEDURE — 71045 X-RAY EXAM CHEST 1 VIEW: CPT

## 2018-01-04 RX ADMIN — ATORVASTATIN CALCIUM 20 MG: 20 TABLET, FILM COATED ORAL at 20:49

## 2018-01-04 RX ADMIN — Medication 100 MG: at 08:54

## 2018-01-04 RX ADMIN — GUAIFENESIN 600 MG: 600 TABLET, EXTENDED RELEASE ORAL at 08:54

## 2018-01-04 RX ADMIN — AMIODARONE HYDROCHLORIDE 200 MG: 200 TABLET ORAL at 08:54

## 2018-01-04 RX ADMIN — ACETAMINOPHEN 650 MG: 325 TABLET, FILM COATED ORAL at 08:58

## 2018-01-04 RX ADMIN — METOPROLOL TARTRATE 25 MG: 25 TABLET ORAL at 08:54

## 2018-01-04 RX ADMIN — PANTOPRAZOLE SODIUM 40 MG: 40 TABLET, DELAYED RELEASE ORAL at 08:54

## 2018-01-04 RX ADMIN — PANTOPRAZOLE SODIUM 40 MG: 40 TABLET, DELAYED RELEASE ORAL at 17:31

## 2018-01-04 RX ADMIN — FOLIC ACID 1 MG: 1 TABLET ORAL at 08:54

## 2018-01-04 RX ADMIN — AMIODARONE HYDROCHLORIDE 200 MG: 200 TABLET ORAL at 20:49

## 2018-01-04 RX ADMIN — Medication 5 MG: at 20:49

## 2018-01-04 RX ADMIN — FUROSEMIDE 40 MG: 40 TABLET ORAL at 08:54

## 2018-01-04 RX ADMIN — DIGOXIN 125 MCG: 125 TABLET ORAL at 11:35

## 2018-01-04 RX ADMIN — ASPIRIN 81 MG 81 MG: 81 TABLET ORAL at 08:54

## 2018-01-04 RX ADMIN — GUAIFENESIN 600 MG: 600 TABLET, EXTENDED RELEASE ORAL at 20:49

## 2018-01-04 RX ADMIN — PAROXETINE HYDROCHLORIDE 20 MG: 20 TABLET, FILM COATED ORAL at 08:54

## 2018-01-04 NOTE — PLAN OF CARE
Problem: Patient Care Overview (Adult)  Goal: Plan of Care Review  Outcome: Ongoing (interventions implemented as appropriate)   01/04/18 0845   Coping/Psychosocial Response Interventions   Plan Of Care Reviewed With patient   Patient Care Overview   Progress improving   Outcome Evaluation   Outcome Summary/Follow up Plan Pt improved with gait distance this AM increasing to 40 feet with CGA. Pt has the most difficulty when standing requiring ModA from bed. Pt fatigues quickly and is progressing towards PT goals. Will continue to benefit from skilled PT to improve mobility and safety prior to d/c.       Problem: Inpatient Physical Therapy  Goal: Transfer Training Goal 1 LTG- PT  Outcome: Ongoing (interventions implemented as appropriate)   01/02/18 0953 01/04/18 0845   Transfer Training PT LTG   Transfer Training PT LTG, Date Established 01/02/18 --    Transfer Training PT LTG, Time to Achieve 2 wks --    Transfer Training PT LTG, Activity Type bed to chair /chair to bed;sit to stand/stand to sit --    Transfer Training PT LTG, [place] Level supervision required --    Transfer Training PT LTG, Assist Device walker, rolling --    Transfer Training PT LTG, Outcome --  goal ongoing     Goal: Gait Training Goal LTG- PT  Outcome: Ongoing (interventions implemented as appropriate)   01/02/18 0953 01/04/18 0845   Gait Training PT LTG   Gait Training Goal PT LTG, Date Established 01/02/18 --    Gait Training Goal PT LTG, Time to Achieve 2 wks --    Gait Training Goal PT LTG, [place] Level conditional independence --    Gait Training Goal PT LTG, Assist Device walker, rolling --    Gait Training Goal PT LTG, Distance to Achieve 150 --    Gait Training Goal PT LTG, Outcome --  goal ongoing     Goal: Static Standing Balance Goal LTG- PT  Outcome: Ongoing (interventions implemented as appropriate)   01/02/18 0953 01/04/18 0845   Static Standing Balance PT LTG   Static Standing Balance PT LTG, Date Established 01/02/18  --    Static Standing Balance PT LTG, Time to Achieve 2 wks --    Static Standing Balance PT LTG, [place] Level conditional independence --    Static Standing Balance PT LTG, Assist Device assistive Device --    Static Standing Balance PT LTG, Outcome --  goal ongoing

## 2018-01-04 NOTE — PROGRESS NOTES
Continued Stay Note  [facility]     Patient Name: Margarita Davila  MRN: [id number]  Today's Date: 1/4/2018    Admit Date: 12/31/2017          Discharge Plan       01/04/18 1554    Case Management/Social Work Plan    Plan [facility]    Patient/Family In Agreement With Plan yes    Additional Comments Pt has a bed when medically ready at [facility] in [place]. Pt's daughter reports she will provide transportation. Number to call report is [phone]. Please provide a discharge summary and hard scripts for any controlled medications. Discharge summary to be faxed to [phone].    Final Note    Final Note SW is following              Discharge Codes     None        Expected Discharge Date and Time     Expected Discharge Date Expected Discharge Time    Jan 4, 2018             LATRICIA Herrera

## 2018-01-04 NOTE — PLAN OF CARE
Problem: Patient Care Overview (Adult)  Goal: Adult Individualization and Mutuality  Outcome: Ongoing (interventions implemented as appropriate)      Problem: Fall Risk (Adult)  Goal: Identify Related Risk Factors and Signs and Symptoms  Outcome: Outcome(s) achieved Date Met: 01/04/18    Goal: Absence of Falls  Outcome: Ongoing (interventions implemented as appropriate)      Problem: Respiratory Insufficiency (Adult)  Goal: Identify Related Risk Factors and Signs and Symptoms  Outcome: Outcome(s) achieved Date Met: 01/04/18    Goal: Acid/Base Balance  Outcome: Ongoing (interventions implemented as appropriate)    Goal: Effective Ventilation  Outcome: Ongoing (interventions implemented as appropriate)      Problem: Activity Intolerance (Adult)  Goal: Identify Related Risk Factors and Signs and Symptoms  Outcome: Outcome(s) achieved Date Met: 01/04/18    Goal: Activity Tolerance  Outcome: Ongoing (interventions implemented as appropriate)    Goal: Effective Energy Conservation Techniques  Outcome: Ongoing (interventions implemented as appropriate)

## 2018-01-04 NOTE — THERAPY TREATMENT NOTE
Acute Care - Physical Therapy Treatment Note  [facility]     Patient Name: Margarita Davila  : 1945  MRN: [id number]  Today's Date: 2018  Onset of Illness/Injury or Date of Surgery Date: 17  Date of Referral to PT: 17  Referring Physician: LULU Torres    Admit Date: 2017    Visit Dx:    ICD-10-CM ICD-9-CM   1. Impaired functional mobility, balance, gait, and endurance Z74.09 V49.89   2. Impaired mobility and ADLs Z74.09 799.89     Patient Active Problem List   Diagnosis   • Atrial fibrillation   • Medical non-compliance   • History of tobacco abuse   • History of alcohol abuse   • End stage COPD   • Supplemental oxygen dependent   • Recurrent right pleural effusion   • Recurrent left pleural effusion   • Debility   • Hyperlipidemia   • Depression with anxiety   • Hypertension   • Pleural effusion               Adult Rehabilitation Note       18 0845 18 1442 18 0905    Rehab Assessment/Intervention    Discipline physical therapist  -LM physical therapist  -DM physical therapist  -SJ    Document Type therapy note (daily note)  -LM therapy note (daily note)  -DM     Subjective Information agree to therapy;complains of;weakness;pain  -LM agree to therapy;complains of;weakness  -DM     Patient Effort, Rehab Treatment good  -LM good  -DM     Symptoms Noted During/After Treatment fatigue  -LM fatigue;shortness of breath;significant change in vital signs  -DM     Precautions/Limitations fall precautions;oxygen therapy device and L/min  -LM fall precautions;oxygen therapy device and L/min  -DM     Specific Treatment Considerations  h/o ETOH,A/D  -DM     Recorded by [LM] Gardenia Ding, PT [DM] Natasha Mock PT [SJ] Aaliyah Hilton, PT    Vital Signs    Pre Systolic BP Rehab 120  -LM 92  -DM     Pre Treatment Diastolic BP 92  -LM 66  -DM     Post Systolic BP Rehab 93  -  -DM     Post Treatment Diastolic BP 67  -LM 82  -DM     Pretreatment Heart Rate (beats/min) 90  -LM 80    IRREG.  -DM     Intratreatment Heart Rate (beats/min)  105  -DM     Posttreatment Heart Rate (beats/min) 93  -LM 75  -DM     Pre SpO2 (%) 94  -  -DM     O2 Delivery Pre Treatment supplemental O2   3L  -LM supplemental O2   3 L  -DM     Post SpO2 (%) 96  -LM 93  -DM     O2 Delivery Post Treatment supplemental O2   3L  -LM supplemental O2  -DM     Pre Patient Position Supine  -LM Supine  -DM     Intra Patient Position Standing  -LM Standing  -DM     Post Patient Position Sitting  -LM Sitting  -DM     Recorded by [LM] Gardenia Ding, PT [DM] Natasha Mock, PT     Pain Assessment    Pain Assessment 0-10  -LM No/denies pain  -DM     Pain Score 6  -LM      Post Pain Score 4  -LM      Pain Type Acute pain  -LM      Pain Location Rib cage  -LM      Pain Orientation Left  -LM      Pain Intervention(s) Repositioned;Ambulation/increased activity   RN notified and gave tylenol during tx  -LM      Response to Interventions Pain decreased with activity; Pt tolerated well  -LM      Recorded by [LM] Gardenia Ding, PT [DM] Natasha Mock, PT     Cognitive Assessment/Intervention    Current Cognitive/Communication Assessment functional  -LM functional  -DM     Orientation Status oriented x 4  -LM oriented x 4  -DM     Follows Commands/Answers Questions 100% of the time  -% of the time;able to follow single-step instructions;needs cueing;needs increased time;needs repetition  -DM     Personal Safety mild impairment  -LM mild impairment;decreased awareness, need for assist;decreased awareness, need for safety;decreased insight to deficits  -DM     Personal Safety Interventions fall prevention program maintained;gait belt;muscle strengthening facilitated;nonskid shoes/slippers when out of bed  -LM fall prevention program maintained;gait belt;nonskid shoes/slippers when out of bed  -DM     Recorded by [LM] Gardenia Ding, PT [DM] Natasha Mock, PT     Bed Mobility, Assessment/Treatment    Bed Mobility, Assistive Device bed  rails;head of bed elevated  -LM head of bed elevated;bed rails  -DM     Bed Mobility, Roll Left, [place]  conditional independence  -DM     Bed Mobility, Scoot/Bridge, [place] supervision required  -LM      Bed Mob, Supine to Sit, [place] supervision required  -LM minimum assist (75% patient effort);verbal cues required  -DM     Bed Mob, Sit to Supine, [place] not tested  -LM      Bed Mobility, Safety Issues decreased use of arms for pushing/pulling  -LM decreased use of arms for pushing/pulling  -DM     Bed Mobility, Impairments  strength decreased  -DM     Bed Mobility, Comment Vc's for sequencing  -LM Cues for HP  -DM     Recorded by [LM] Gardenia Ding, PT [DM] Natasha Mock, PT     Transfer Assessment/Treatment    Transfers, Bed-Chair [place]  maximum assist (25% patient effort)   3Pivot stepsEOB to recliner;modSOB;knees buckled;sat quickly  -DM     Transfers, Sit-Stand [place] moderate assist (50% patient effort)   Stood x 2  -LM maximum assist (25% patient effort);verbal cues required   3 TRIALS;knees buckling  -DM     Transfers, Stand-Sit [place] minimum assist (75% patient effort)  -LM moderate assist (50% patient effort);verbal cues required  -DM     Transfers, Sit-Stand-Sit, Assist Device rolling walker  -LM rolling walker  -DM     Transfer, Safety Issues  sequencing ability decreased;step length decreased;weight-shifting ability decreased;loses balance backward;knees buckling   3RD trial w/o Rwx(PT stabiliz.pt's knees)  -DM     Transfer, Impairments strength decreased  -LM strength decreased;impaired balance;postural control impaired  -DM     Transfer, Comment First stood from bed with ModA; Stood second time from chair with Lou;  Vc's needed for hand placement with sit to stands.  -LM cues for HP,seq  -DM     Recorded by [LM] Gardenia Ding, PT [DM] Natasha Mock, PT     Gait Assessment/Treatment    Gait, [place] Level contact guard assist  -LM moderate assist  (50% patient effort);verbal cues required   modSOB;incr.flexed posture;knees buckling;sat on opp. EOB  -DM     Gait, Assistive Device rolling walker  -LM rolling walker  -DM     Gait, Distance (Feet) 40   x2  -LM 15  -DM     Gait, Gait Pattern Analysis  swing-to gait  -DM     Gait, Gait Deviations john decreased  -LM john decreased;decreased heel strike;forward flexed posture;knee buckling;narrow base;step length decreased;weight-shifting ability decreased  -DM     Gait, Safety Issues step length decreased;balance decreased during turns  -LM step length decreased;weight-shifting ability decreased;supplemental O2  -DM     Gait, Impairments strength decreased;impaired balance  -LM strength decreased;impaired balance;postural control impaired  -DM     Gait, Comment Pt took one small standing rest break with each gait cycle.  -LM cues for incr.step length & QUINTEN,trunk ext,PLB   5 MIN.sit rest;unable to stand/amb 2nd time;chair to pt.;SPT  -DM     Recorded by [LM] Gardenia Ding, PT [DM] Natasha Mock, PT     Stairs Assessment/Treatment    Stairs, [place] Level not tested  -LM      Recorded by [LM] Gardenia Ding, PT      Motor Skills/Interventions    Additional Documentation Balance Skills Training (Group)  -LM Balance Skills Training (Group)  -DM     Recorded by [LM] Gardenia Ding, PT [DM] Natasha Mock, PT     Balance Skills Training    Sitting-Level of Assistance Close supervision  -LM Contact guard  -DM     Sitting-Balance Support Feet supported;Right upper extremity supported;Left upper extremity supported  -LM Right upper extremity supported;Left upper extremity supported;Feet supported  -DM     Sitting-Balance Activities  Trunk control activities  -DM     Sitting # of Minutes  5  -DM     Standing-Level of Assistance Contact guard  -LM Minimum assistance  -DM     Static Standing Balance Support assistive device  -LM assistive device  -DM     Standing-Balance Activities  Weight Shift A-P  -DM     Standing  Balance # of Minutes  1  -DM     Gait Balance-Level of Assistance Contact guard  -LM Moderate assistance  -DM     Gait Balance Support assistive device  -LM assistive device  -DM     Gait Balance Activities  scanning environment R/L;side-stepping  -DM     Recorded by [LM] Gardenia Ding, PT [DM] Natasha Mock, LITA     Therapy Exercises    Bilateral Lower Extremities AROM:;20 reps;sitting;ankle pumps/circles;hip flexion;LAQ;15 reps;glut sets;heel slides;hip abduction/adduction   HS and hip abd completed with chair reclined  - AAROM:;AROM:;10 reps;sitting;ankle pumps/circles;glut sets;heel slides;hip abduction/adduction;hip ER;hip flexion;hip IR;LAQ;quad sets;SAQ   Pillow squeezes  -DM     Bilateral Upper Extremity  AROM:;10 reps;15 reps;sitting;elbow flexion/extension;shoulder abduction/adduction;shoulder extension/flexion  -DM     Recorded by [LM] Gardenia Ding, PT [DM] Natasha Mock, PT     Positioning and Restraints    Pre-Treatment Position in bed  -LM in bed  -DM     Post Treatment Position chair  -LM chair  -DM     In Chair reclined;call light within reach;encouraged to call for assist;exit alarm on;notified nsg  -LM notified nsg;reclined;call light within reach;encouraged to call for assist;exit alarm on;with other staff;RUE elevated;LUE elevated;waffle cushion;legs elevated   multDelays(meds,thenNsg broughtPhenergan/assessed;NsgMgr in  -DM     Recorded by [LM] Gardenia Ding, PT [DM] Natasha Mock, PT       User Key  (r) = Recorded By, (t) = Taken By, (c) = Cosigned By    Initials Name Effective Dates    KASSIE Hilton, PT 06/19/15 -     ANNETTE Mock, PT 06/19/15 -     LM Gardenia Ding, PT 06/15/16 -                 IP PT Goals       01/04/18 0845 01/03/18 1522 01/02/18 0953    Transfer Training PT LTG    Transfer Training PT LTG, Date Established   01/02/18  -SJ    Transfer Training PT LTG, Time to Achieve   2 wks  -SJ    Transfer Training PT LTG, Activity Type   bed to chair /chair to bed;sit to  stand/stand to sit  -SJ    Transfer Training PT LTG, [place] Level   supervision required  -SJ    Transfer Training PT LTG, Assist Device   walker, rolling  -SJ    Transfer Training PT LTG, Outcome goal ongoing  -LM goal ongoing  -DM goal ongoing  -SJ    Gait Training PT LTG    Gait Training Goal PT LTG, Date Established   01/02/18  -SJ    Gait Training Goal PT LTG, Time to Achieve   2 wks  -SJ    Gait Training Goal PT LTG, [place] Level   conditional independence  -SJ    Gait Training Goal PT LTG, Assist Device   walker, rolling  -SJ    Gait Training Goal PT LTG, Distance to Achieve   150  -SJ    Gait Training Goal PT LTG, Outcome goal ongoing  -LM goal ongoing  -DM goal ongoing  -SJ    Static Standing Balance PT LTG    Static Standing Balance PT LTG, Date Established   01/02/18  -SJ    Static Standing Balance PT LTG, Time to Achieve   2 wks  -SJ    Static Standing Balance PT LTG, [place] Level   conditional independence  -SJ    Static Standing Balance PT LTG, Assist Device   assistive Device  -SJ    Static Standing Balance PT LTG, Outcome goal ongoing  -LM goal ongoing  -DM goal ongoing  -SJ      User Key  (r) = Recorded By, (t) = Taken By, (c) = Cosigned By    Initials Name Provider Type    KASSIE Hilton, PT Physical Therapist    ANNETTE Mock, PT Physical Therapist    KAREN Ding, PT Physical Therapist          Physical Therapy Education     Title: PT OT SLP Therapies (Active)     Topic: Physical Therapy (Active)     Point: Mobility training (Done)    Learning Progress Summary    Learner Readiness Method Response Comment Documented by Status   Patient Acceptance E VANE,NR  LM 01/04/18 0936 Done    CAROL Chambers NR   01/03/18 1522 Active    Acceptance E NR   01/02/18 0956 Active               Point: Home exercise program (Active)    Learning Progress Summary    Learner Readiness Method Response Comment Documented by Status   Patient EagCAROL Ta NR  ANNETTE 01/03/18 1522 Active    Acceptance E  NR   01/02/18 0956 Active               Point: Body mechanics (Done)    Learning Progress Summary    Learner Readiness Method Response Comment Documented by Status   Patient Acceptance E VANE,RAJANI  LM 01/04/18 0936 Done    Eager ED NR  DM 01/03/18 1522 Active    Acceptance E NR   01/02/18 0956 Active                      User Key     Initials Effective Dates Name Provider Type Discipline     06/19/15 -  Aaliyah Hilton, PT Physical Therapist PT     06/19/15 -  Natasha Mock, PT Physical Therapist PT     06/15/16 -  Gardenia Ding, PT Physical Therapist PT                    PT Recommendation and Plan  Anticipated Discharge Disposition: inpatient rehabilitation facility  Planned Therapy Interventions: balance training, bed mobility training, gait training, home exercise program, patient/family education, strengthening, transfer training  PT Frequency: daily  Plan of Care Review  Plan Of Care Reviewed With: patient  Progress: improving  Outcome Summary/Follow up Plan: Pt improved with gait distance this AM increasing to 40 feet with CGA.  Pt has the most difficulty when standing requiring ModA from bed.  Pt fatigues quickly and is progressing towards PT goals.  Will continue to benefit from skilled PT to improve mobility and safety prior to d/c.          Outcome Measures       01/04/18 0845 01/03/18 1442 01/02/18 0906    How much help from another person do you currently need...    Turning from your back to your side while in flat bed without using bedrails? 3  -LM 3  -DM     Moving from lying on back to sitting on the side of a flat bed without bedrails? 3  -LM 3  -DM     Moving to and from a bed to a chair (including a wheelchair)? 2  -LM 2  -DM     Standing up from a chair using your arms (e.g., wheelchair, bedside chair)? 2  -LM 2  -DM     Climbing 3-5 steps with a railing? 2  -LM 2  -DM     To walk in hospital room? 3  -LM 2  -DM     AM-PAC 6 Clicks Score 15  -LM 14  -DM     How much help from another is  currently needed...    Putting on and taking off regular lower body clothing?   2  -AC    Bathing (including washing, rinsing, and drying)   2  -AC    Toileting (which includes using toilet bed pan or urinal)   2  -AC    Putting on and taking off regular upper body clothing   3  -AC    Taking care of personal grooming (such as brushing teeth)   3  -AC    Eating meals   4  -AC    Score   16  -AC    Functional Assessment    Outcome Measure Options AM-PAC 6 Clicks Basic Mobility (PT)  -LM AM-PAC 6 Clicks Basic Mobility (PT)  -DM AM-PAC 6 Clicks Daily Activity (OT)  -AC      01/02/18 0905          How much help from another person do you currently need...    Turning from your back to your side while in flat bed without using bedrails? 3  -SJ      Moving from lying on back to sitting on the side of a flat bed without bedrails? 3  -SJ      Moving to and from a bed to a chair (including a wheelchair)? 3  -SJ      Standing up from a chair using your arms (e.g., wheelchair, bedside chair)? 2  -SJ      Climbing 3-5 steps with a railing? 2  -SJ      To walk in hospital room? 3  -SJ      AM-PAC 6 Clicks Score 16  -SJ      Functional Assessment    Outcome Measure Options AM-PAC 6 Clicks Basic Mobility (PT)  -SJ        User Key  (r) = Recorded By, (t) = Taken By, (c) = Cosigned By    Initials Name Provider Type    AC Iva Delgado, OT Occupational Therapist    SJ Aaliyah Hilton, PT Physical Therapist    ANNETTE Mock, PT Physical Therapist    KAREN Ding, LITA Physical Therapist           Time Calculation:         PT Charges       01/04/18 0845          Time Calculation    Start Time 0845  -LM      PT Received On 01/04/18  -LM      PT Goal Re-Cert Due Date 01/12/18  -LM      Time Calculation- PT    Total Timed Code Minutes- PT 40 minute(s)  -LM        User Key  (r) = Recorded By, (t) = Taken By, (c) = Cosigned By    Initials Name Provider Type    KAREN Ding, LITA Physical Therapist          Therapy Charges for Today      Code Description Service Date Service Provider Modifiers Qty    90799971395 HC GAIT TRAINING EA 15 MIN 1/4/2018 Gardenia Ding, PT GP 1    16151554390 HC PT THER PROC EA 15 MIN 1/4/2018 Gardenia Ding, PT GP 2          PT G-Codes  Outcome Measure Options: AM-PAC 6 Clicks Basic Mobility (PT)    Gardenia Ding, PT  1/4/2018

## 2018-01-04 NOTE — THERAPY TREATMENT NOTE
Acute Care - Physical Therapy Treatment Note  [facility]     Patient Name: Margarita Davila  : 1945  MRN: [id number]  Today's Date: 1/3/2018  Onset of Illness/Injury or Date of Surgery Date: 17  Date of Referral to PT: 17  Referring Physician: LULU Torres    Admit Date: 2017    Visit Dx:    ICD-10-CM ICD-9-CM   1. Impaired functional mobility, balance, gait, and endurance Z74.09 V49.89   2. Impaired mobility and ADLs Z74.09 799.89     Patient Active Problem List   Diagnosis   • Atrial fibrillation   • Medical non-compliance   • History of tobacco abuse   • History of alcohol abuse   • End stage COPD   • Supplemental oxygen dependent   • Recurrent right pleural effusion   • Recurrent left pleural effusion   • Debility   • Hyperlipidemia   • Depression with anxiety   • Hypertension   • Pleural effusion               Adult Rehabilitation Note       18 1442 18 0905       Rehab Assessment/Intervention    Discipline physical therapist  -DM physical therapist  -     Document Type therapy note (daily note)  -DM      Subjective Information agree to therapy;complains of;weakness  -DM      Patient Effort, Rehab Treatment good  -DM      Symptoms Noted During/After Treatment fatigue;shortness of breath;significant change in vital signs  -DM      Precautions/Limitations fall precautions;oxygen therapy device and L/min  -DM      Specific Treatment Considerations h/o ETOH,A/D  -DM      Recorded by [DM] Natasha Mock, PT [SJ] Aaliyah Hilton PT     Vital Signs    Pre Systolic BP Rehab 92  -DM      Pre Treatment Diastolic BP 66  -DM      Post Systolic BP Rehab 121  -DM      Post Treatment Diastolic BP 82  -DM      Pretreatment Heart Rate (beats/min) 80   IRREG.  -DM      Intratreatment Heart Rate (beats/min) 105  -DM      Posttreatment Heart Rate (beats/min) 75  -DM      Pre SpO2 (%) 100  -DM      O2 Delivery Pre Treatment supplemental O2   3 L  -DM      Post SpO2 (%) 93  -DM      O2 Delivery  Post Treatment supplemental O2  -DM      Pre Patient Position Supine  -DM      Intra Patient Position Standing  -DM      Post Patient Position Sitting  -DM      Recorded by [DM] Natasha Mock, PT      Pain Assessment    Pain Assessment No/denies pain  -DM      Recorded by [DM] Natasha Mock, PT      Cognitive Assessment/Intervention    Current Cognitive/Communication Assessment functional  -DM      Orientation Status oriented x 4  -DM      Follows Commands/Answers Questions 100% of the time;able to follow single-step instructions;needs cueing;needs increased time;needs repetition  -DM      Personal Safety mild impairment;decreased awareness, need for assist;decreased awareness, need for safety;decreased insight to deficits  -DM      Personal Safety Interventions fall prevention program maintained;gait belt;nonskid shoes/slippers when out of bed  -DM      Recorded by [DM] Natasha Mock, PT      Bed Mobility, Assessment/Treatment    Bed Mobility, Assistive Device head of bed elevated;bed rails  -DM      Bed Mobility, Roll Left, [place] conditional independence  -DM      Bed Mob, Supine to Sit, [place] minimum assist (75% patient effort);verbal cues required  -DM      Bed Mobility, Safety Issues decreased use of arms for pushing/pulling  -DM      Bed Mobility, Impairments strength decreased  -DM      Bed Mobility, Comment Cues for HP  -DM      Recorded by [DM] Natasha Mock, PT      Transfer Assessment/Treatment    Transfers, Bed-Chair [place] maximum assist (25% patient effort)   3Pivot stepsEOB to recliner;modSOB;knees buckled;sat quickly  -DM      Transfers, Sit-Stand [place] maximum assist (25% patient effort);verbal cues required   3 TRIALS;knees buckling  -DM      Transfers, Stand-Sit [place] moderate assist (50% patient effort);verbal cues required  -DM      Transfers, Sit-Stand-Sit, Assist Device rolling walker  -DM      Transfer, Safety Issues sequencing ability decreased;step  length decreased;weight-shifting ability decreased;loses balance backward;knees buckling   3RD trial w/o Rwx(PT stabiliz.pt's knees)  -DM      Transfer, Impairments strength decreased;impaired balance;postural control impaired  -DM      Transfer, Comment cues for HP,seq  -DM      Recorded by [DM] Natasha Mock, PT      Gait Assessment/Treatment    Gait, [place] Level moderate assist (50% patient effort);verbal cues required   modSOB;incr.flexed posture;knees buckling;sat on opp. EOB  -DM      Gait, Assistive Device rolling walker  -DM      Gait, Distance (Feet) 15  -DM      Gait, Gait Pattern Analysis swing-to gait  -DM      Gait, Gait Deviations john decreased;decreased heel strike;forward flexed posture;knee buckling;narrow base;step length decreased;weight-shifting ability decreased  -DM      Gait, Safety Issues step length decreased;weight-shifting ability decreased;supplemental O2  -DM      Gait, Impairments strength decreased;impaired balance;postural control impaired  -DM      Gait, Comment cues for incr.step length & QUINTEN,trunk ext,PLB   5 MIN.sit rest;unable to stand/amb 2nd time;chair to pt.;SPT  -DM      Recorded by [DM] Natasha Mock, PT      Motor Skills/Interventions    Additional Documentation Balance Skills Training (Group)  -DM      Recorded by [DM] Natasha Mock, PT      Balance Skills Training    Sitting-Level of Assistance Contact guard  -DM      Sitting-Balance Support Right upper extremity supported;Left upper extremity supported;Feet supported  -DM      Sitting-Balance Activities Trunk control activities  -DM      Sitting # of Minutes 5  -DM      Standing-Level of Assistance Minimum assistance  -DM      Static Standing Balance Support assistive device  -DM      Standing-Balance Activities Weight Shift A-P  -DM      Standing Balance # of Minutes 1  -DM      Gait Balance-Level of Assistance Moderate assistance  -DM      Gait Balance Support assistive device  -DM      Gait Balance  Activities scanning environment R/L;side-stepping  -DM      Recorded by [DM] Natasha Mock, PT      Therapy Exercises    Bilateral Lower Extremities AAROM:;AROM:;10 reps;sitting;ankle pumps/circles;glut sets;heel slides;hip abduction/adduction;hip ER;hip flexion;hip IR;LAQ;quad sets;SAQ   Pillow squeezes  -DM      Bilateral Upper Extremity AROM:;10 reps;15 reps;sitting;elbow flexion/extension;shoulder abduction/adduction;shoulder extension/flexion  -DM      Recorded by [DM] Natasha Mock, PT      Positioning and Restraints    Pre-Treatment Position in bed  -DM      Post Treatment Position chair  -DM      In Chair notified nsg;reclined;call light within reach;encouraged to call for assist;exit alarm on;with other staff;RUE elevated;LUE elevated;waffle cushion;legs elevated   multDelays(meds,thenNsg broughtPhenergan/assessed;NsgMgr in  -DM      Recorded by [DM] Natasha Mock, PT        User Key  (r) = Recorded By, (t) = Taken By, (c) = Cosigned By    Initials Name Effective Dates    SJ Aaliyah Hilton, PT 06/19/15 -     DM Natasha Mock, PT 06/19/15 -                 IP PT Goals       01/03/18 1522 01/02/18 0953       Transfer Training PT LTG    Transfer Training PT LTG, Date Established  01/02/18  -SJ     Transfer Training PT LTG, Time to Achieve  2 wks  -SJ     Transfer Training PT LTG, Activity Type  bed to chair /chair to bed;sit to stand/stand to sit  -SJ     Transfer Training PT LTG, [place] Level  supervision required  -SJ     Transfer Training PT LTG, Assist Device  walker, rolling  -SJ     Transfer Training PT LTG, Outcome goal ongoing  -DM goal ongoing  -SJ     Gait Training PT LTG    Gait Training Goal PT LTG, Date Established  01/02/18  -SJ     Gait Training Goal PT LTG, Time to Achieve  2 wks  -SJ     Gait Training Goal PT LTG, [place] Level  conditional independence  -SJ     Gait Training Goal PT LTG, Assist Device  walker, rolling  -SJ     Gait Training Goal PT LTG, Distance to Achieve   150  -SJ     Gait Training Goal PT LTG, Outcome goal ongoing  -DM goal ongoing  -SJ     Static Standing Balance PT LTG    Static Standing Balance PT LTG, Date Established  01/02/18  -SJ     Static Standing Balance PT LTG, Time to Achieve  2 wks  -SJ     Static Standing Balance PT LTG, [place] Level  conditional independence  -SJ     Static Standing Balance PT LTG, Assist Device  assistive Device  -SJ     Static Standing Balance PT LTG, Outcome goal ongoing  -DM goal ongoing  -SJ       User Key  (r) = Recorded By, (t) = Taken By, (c) = Cosigned By    Initials Name Provider Type    KASSIE Hilton, PT Physical Therapist    ANNETTE Mock, PT Physical Therapist          Physical Therapy Education     Title: PT OT SLP Therapies (Active)     Topic: Physical Therapy (Active)     Point: Mobility training (Active)    Learning Progress Summary    Learner Readiness Method Response Comment Documented by Status   Patient CAROL Chambers NR   01/03/18 1522 Active    Acceptance E NR   01/02/18 0956 Active               Point: Home exercise program (Active)    Learning Progress Summary    Learner Readiness Method Response Comment Documented by Status   Patient CAROL Chambers   01/03/18 1522 Active    Acceptance E NR   01/02/18 0956 Active               Point: Body mechanics (Active)    Learning Progress Summary    Learner Readiness Method Response Comment Documented by Status   Patient CAROL Chambers NR   01/03/18 1522 Active    Acceptance E NR   01/02/18 0956 Active                      User Key     Initials Effective Dates Name Provider Type Discipline     06/19/15 -  Aaliyah Hilton, PT Physical Therapist PT    ANNETTE 06/19/15 -  Natasha Mock, PT Physical Therapist PT                    PT Recommendation and Plan  Anticipated Discharge Disposition: inpatient rehabilitation facility  Planned Therapy Interventions: balance training, bed mobility training, gait training, home exercise program, patient/family  education, strengthening, transfer training  PT Frequency: daily  Plan of Care Review  Plan Of Care Reviewed With: patient  Progress: progress towards functional goals is fair  Outcome Summary/Follow up Plan: Able to amb 15 ft w/ Rwx & mod asst,but onset B knee instab,mod SOB, & signif fat.,& unable to attempt 2nd walk after 5 min rest;limited by low HGB (10.8), Nausea,respir.insuff.,decr.endurance          Outcome Measures       01/03/18 1442 01/02/18 0906 01/02/18 0905    How much help from another person do you currently need...    Turning from your back to your side while in flat bed without using bedrails? 3  -DM  3  -SJ    Moving from lying on back to sitting on the side of a flat bed without bedrails? 3  -DM  3  -SJ    Moving to and from a bed to a chair (including a wheelchair)? 2  -DM  3  -SJ    Standing up from a chair using your arms (e.g., wheelchair, bedside chair)? 2  -DM  2  -SJ    Climbing 3-5 steps with a railing? 2  -DM  2  -SJ    To walk in hospital room? 2  -DM  3  -SJ    AM-PAC 6 Clicks Score 14  -DM  16  -SJ    How much help from another is currently needed...    Putting on and taking off regular lower body clothing?  2  -AC     Bathing (including washing, rinsing, and drying)  2  -AC     Toileting (which includes using toilet bed pan or urinal)  2  -AC     Putting on and taking off regular upper body clothing  3  -AC     Taking care of personal grooming (such as brushing teeth)  3  -AC     Eating meals  4  -AC     Score  16  -AC     Functional Assessment    Outcome Measure Options AM-PAC 6 Clicks Basic Mobility (PT)  -DM AM-PAC 6 Clicks Daily Activity (OT)  -AC AM-PAC 6 Clicks Basic Mobility (PT)  -SJ      User Key  (r) = Recorded By, (t) = Taken By, (c) = Cosigned By    Initials Name Provider Type    AC Iva Deglado, OT Occupational Therapist    SJ Aaliyah Hilton, PT Physical Therapist    ANNETTE Mock, PT Physical Therapist           Time Calculation:         PT Charges       01/03/18  1522          Time Calculation    Start Time 1442  -DM      PT Received On 01/03/18  -DM      PT Goal Re-Cert Due Date 01/12/18  -DM      Time Calculation- PT    Total Timed Code Minutes- PT 25 minute(s)  -DM        User Key  (r) = Recorded By, (t) = Taken By, (c) = Cosigned By    Initials Name Provider Type    DM Natasha Mock, PT Physical Therapist          Therapy Charges for Today     Code Description Service Date Service Provider Modifiers Qty    29410169549 HC PT THER PROC EA 15 MIN 1/3/2018 Natasha Mock, PT GP 1    66079387038 HC GAIT TRAINING EA 15 MIN 1/3/2018 Natasha Mock, PT GP 1          PT G-Codes  Outcome Measure Options: AM-PAC 6 Clicks Basic Mobility (PT)    Natasha Mock, PT  1/3/2018

## 2018-01-04 NOTE — PROGRESS NOTES
INPATIENT PULMONARY SERVICE  PROGRESS NOTE     Hospital LOS: 4 days    Ms. Margarita Davila, is followed for a Chief Complaint of: Pleural Effusion    Principal Problem:    Recurrent right pleural effusion  Active Problems:    Atrial fibrillation    Medical non-compliance    History of tobacco abuse    History of alcohol abuse    End stage COPD    Supplemental oxygen dependent    Recurrent left pleural effusion    Debility    Hyperlipidemia    Depression with anxiety    Hypertension    Pleural effusion      Subjective   S   Ms. Davila is a 71yo F who was transferred from [facility] for further evaluation of dyspnea and pleural effusions. She has severe COPD and was hospitalized twice at [facility] in November and December. She was admitted again in December to [facility] where she was found to have complete opacification of her right chest. She had a right sided thoracentesis with 1.5L of fluid removed. This was consistent with a transudate. Cytology was negative as well. She was transferred here for further evaluation.       Interval History:  She reports that her breathing is stable this AM. She continues to have a cough and dyspnea. She is trying to eat.        The patient's relevant past medical, surgical and social history were reviewed and updated in Epic as appropriate.      ROS:   Constitutional: Negative for fever.   Respiratory: Positive for dyspnea.   Cardiovascular: Negative for chest pain.   Gastrointestinal: Negative for  nausea, vomiting and diarrhea.     Objective   O     Vitals  Temp  Min: 97.5 °F (36.4 °C)  Max: 98.8 °F (37.1 °C)  BP  Min: 93/67  Max: 122/77  Pulse  Min: 73  Max: 93  Resp  Min: 14  Max: 18  SpO2  Min: 96 %  Max: 99 % Flow (L/min)  Min: 3  Max: 3    I/O 24 HR (7:00 AM-6:59AM):  Intake/Output       01/03/18 0700 - 01/04/18 0659 01/04/18 0700 - 01/05/18 0659    Intake (ml) 720 360    Output (ml) 1125 250    Net (ml) -405 110          Medications (Drips):       Physical  Examination    Telemetry:    Atrial Rhythm: atrial fibrillation      Constitutional:  No acute distress.  Conversant.   Cardiovascular: Regular rate and rhythm.  No murmurs, rub or gallop.   Respiratory: Normal symmetric chest expansion.  Normal respiratory effort.  Diminished at the right base.    Abdominal:  Soft. No masses.   Non-tender. No distension.   No hepatosplenomegaly.   Extremities: No digital cyanosis or clubbing.  Trace peripheral edema.   Neurological:   Alert and Oriented to person, place, and time.   Moves all extremities.           Results from last 7 days  Lab Units 01/02/18  0800 01/01/18  0548 12/31/17  2225   WBC 10*3/mm3 8.01 8.50 9.52   HEMOGLOBIN g/dL 10.8* 9.8* 9.9*   MCV fL 95.9 94.9 95.3   PLATELETS 10*3/mm3 287 294 286       Results from last 7 days  Lab Units 01/02/18  0800 01/01/18  0548 12/31/17  2225   SODIUM mmol/L 137 135 135   POTASSIUM mmol/L 5.1 4.4 4.0   CO2 mmol/L 35.0* 32.0* 30.0   CREATININE mg/dL 0.70 0.70 0.70     CREATININE: 0.7 mg/dL (01/02/18 0800)  Estimated creatinine clearance: 62.4 mL/min    Results from last 7 days  Lab Units 01/02/18  0800 12/31/17  2225   ALK PHOS U/L 69 85   BILIRUBIN mg/dL 0.5 0.2*   ALT (SGPT) U/L 43* 38   AST (SGOT) U/L 41* 28             Images:   CXR  Personally reviewed.      Imaging Results (last 24 hours)     Procedure Component Value Units Date/Time    XR Chest 1 View [[id number]] Collected:  01/04/18 0845     Updated:  01/04/18 0845    Narrative:       EXAMINATION: XR CHEST 1 VW-      INDICATION: Pleural effusion; Z74.09-Other reduced mobility.      COMPARISON: 01/03/2018.     FINDINGS: Right basilar effusion appears mildly improved. There is now  only minimal left effusion. Mild interstitial changes elsewhere.  The  lungs appear stable.  Heart is upper normal size. Vasculature appears  normal.        Impression:       Improving pleural effusions is noted.     D:  01/04/2018  E:  01/04/2018             I reviewed the patient's new  clinical results.  I reviewed the patient's new imaging results and agree with the interpretation.    Assessment/Plan   A / P     Ms. Davila is a 71yo F with a history of severe COPD who was transferred for bilateral pleural effusions. The right effusion was recently tapped with 1.5L of fluid removed which was consistent with a transudate. She has a positive rheumatoid factor but this would not give you a transudative effusion. Her effusions are bilateral and are likely secondary to poor nutritional status in the setting of likely diastolic heart failure. The significance of her positive rheumatoid factor is unclear and she will need follow up with a Rheumatologist for further evaluation after discharge.     Diet Regular  Full Code    Hospital Problem List     * (Principal)Recurrent right pleural effusion    Atrial fibrillation    Medical non-compliance    History of tobacco abuse    History of alcohol abuse    End stage COPD    Supplemental oxygen dependent    Recurrent left pleural effusion    Debility    Hyperlipidemia    Depression with anxiety    Hypertension    Pleural effusion          Assessment / Plan:  1. CXR is stable.   2. Continue current measures.   3. She will need to follow up in Pulmonary Clinic in 1-2 weeks with a repeat CXR.       I discussed the patient's findings and my recommendations with patient    Time:  I spent 15 minutes, face to face, with the patient or on the castillo coordinating care with other health care providers.   I spent > 50% percent of this time, counseling and discussing diagnosis and management .     Ene Juarez, DO  Pulmonary and Critical Care Medicine

## 2018-01-04 NOTE — PROGRESS NOTES
[facility] Medicine Services  PROGRESS NOTE    Patient Name: Margarita Davila  : 1945  MRN: [id number]    Date of Admission: 2017  Length of Stay: 4  Primary Care Physician: No primary care provider on file.    Subjective     CC: F/u pleural effusions    HPI:  Sitting upright in chair. She reports that she's already gotten washed up and worked with therapy. She doesn't feel particularly dyspneic. No significant cough. She's looking forward to discharge soon.     Review of Systems  Gen- No fevers, chills  CV- No chest pain, palpitations  Resp- As above  GI- No N/V/D, abd pain    Otherwise ROS is negative except as mentioned in the HPI.    Objective     Vital Signs:   Temp:  [97.5 °F (36.4 °C)-98.8 °F (37.1 °C)] 98.5 °F (36.9 °C)  Heart Rate:  [73-93] 73  Resp:  [14-18] 16  BP: ()/(62-92) 94/66     Physical Exam: Stable  Constitutional: No acute distress, awake, alert  HENT: NCAT, mucous membranes moist  Respiratory: Decreased breath sounds right greater than left base, respiratory effort normal  Cardiovascular: RRR, no murmurs, rubs, or gallops, palpable pedal pulses bilaterally  Gastrointestinal: Positive bowel sounds, soft, nontender, nondistended  Musculoskeletal: No bilateral ankle edema  Psychiatric: Appropriate affect, cooperative  Neurologic: Oriented x 3, strength symmetric in all extremities, Cranial Nerves grossly intact to confrontation, speech clear  Skin: No rashes    Results Reviewed:  I have personally reviewed current lab, radiology, and data and agree.      Results from last 7 days  Lab Units 18  0800 18  0548 17  2225   WBC 10*3/mm3 8.01 8.50 9.52   HEMOGLOBIN g/dL 10.8* 9.8* 9.9*   HEMATOCRIT % 33.1* 29.6* 30.1*   PLATELETS 10*3/mm3 287 294 286   INR   --  0.94  --        Results from last 7 days  Lab Units 18  0800 18  0548 17  2225   SODIUM mmol/L 137 135 135   POTASSIUM mmol/L 5.1 4.4 4.0   CHLORIDE mmol/L 98* 95* 96*    CO2 mmol/L 35.0* 32.0* 30.0   BUN mg/dL 9 14 15   CREATININE mg/dL 0.70 0.70 0.70   GLUCOSE mg/dL 78 79 107*   CALCIUM mg/dL 8.2* 8.1* 7.9*   ALT (SGPT) U/L 43*  --  38   AST (SGOT) U/L 41*  --  28     BNP   Date Value Ref Range Status   01/02/2018 291.0 (H) 0.0 - 100.0 pg/mL Final     Microbiology Results Abnormal     None        Imaging Results (last 24 hours)     Procedure Component Value Units Date/Time    XR Chest 1 View [[id number]] Collected:  01/04/18 0845     Updated:  01/04/18 0845    Narrative:       EXAMINATION: XR CHEST 1 VW-      INDICATION: Pleural effusion; Z74.09-Other reduced mobility.      COMPARISON: 01/03/2018.     FINDINGS: Right basilar effusion appears mildly improved. There is now  only minimal left effusion. Mild interstitial changes elsewhere.  The  lungs appear stable.  Heart is upper normal size. Vasculature appears  normal.        Impression:       Improving pleural effusions is noted.     D:  01/04/2018  E:  01/04/2018           Results for orders placed during the hospital encounter of 12/31/17   Adult Transthoracic Echo Complete W/ Cont if Necessary Per Protocol    Narrative · Left ventricular systolic function is normal. Estimated EF = 55%.  · Left atrial cavity size is borderline dilated.  · Right atrial cavity size is dilated.  · Mild mitral valve regurgitation is present  · Moderate tricuspid valve regurgitation is present.  · Estimated right ventricular systolic pressure from tricuspid   regurgitation is moderately elevated (45-55 mmHg).        I have reviewed the medications.    Assessment / Plan     Hospital Problem List     * (Principal)Recurrent right pleural effusion    Atrial fibrillation    Medical non-compliance    History of tobacco abuse    History of alcohol abuse    End stage COPD    Supplemental oxygen dependent    Recurrent left pleural effusion    Debility    Hyperlipidemia    Depression with anxiety    Hypertension    Pleural effusion        Brief Hospital  Course to date:  Margarita Davila is a 72 y.o. female with a past medical history of HTN, hyperlipidemia, end-stage COPD, atrial fibrillation, tobacco abuse, and alcohol abuse who was transferred to our facility on 12/31 from [facility] where she initially was admitted on 12/19. Outside evaluation there disclosed complete opacification of the right hemithorax with evidence of a large pleural effusion on CT scan. CT thoracentesis on 12/21 drained 1.5 L fluid and pt had a bronchoscopy with negative path per report (data deficient on presence of endobronchial lesion). Pt transferred here for higher level of care:    Assessment & Plan:    Recurrent right pleural effusion, transudative status post thoracentesis of 1.5 L on 12/21  Small to moderate left pleural effusion  - Repeat CXR today personally reviewed, agree with interpretation. Improving pleural effusions.   - Reviewed outside records, procedure note from bronch still not available, BAL cytology negative, no report of endobronchial lesion  - Appreciate Pulm assistance, RF +, ESR negative.   - Cause of pt's transudative effusion is likely predominantly due to hypoalbuminemia and with a possible cardiac component given moderate TR and elevated RVSP  - Ordered nutritional supplements   - Added PO lasix due to mildly elevated BNP - tolerating Lasix. Will check renal function in AM. Recommend repeat BMP in 1 week.   - No evidence of pneumonia, cirrhosis/liver disease, or pericarditis      End-stage COPD, per report  Chronic nocturnal hypoxia, 2LNC at bedtime at home  - Supplemental O2, prn nebs, mucinex    Chronic primary hypertension  - BP low, dc'ed lisinopril and amlodipine and monitoring, pt asymptomatic    Rheumatoid Factor Positive  - unclear significance     Weakness/debility  - PT/OT    Chronic Atrial Fibrillation, ChadsVasc 5  - Continue metoprolol at low dose  - Not chronically anticoagulated due to medication noncompliance per report  - Echo  reviewed as above    Medical Noncompliance     Normocytic Anemia   - stable    Hyperlipidemia    Hx of Tobacco Abuse     Remote Alcohol Use     DVT Prophylaxis: mechanical given possible need for procedure   CODE STATUS: Full Code     Disposition: I expect the patient to be discharged to rehab tomorrow or Saturday    Kavitha Rutherford PA-C  01/04/18  2:04 PM

## 2018-01-04 NOTE — PLAN OF CARE
Problem: Patient Care Overview (Adult)  Goal: Plan of Care Review  Outcome: Ongoing (interventions implemented as appropriate)   01/03/18 1522   Coping/Psychosocial Response Interventions   Plan Of Care Reviewed With patient   Patient Care Overview   Progress progress towards functional goals is fair   Outcome Evaluation   Outcome Summary/Follow up Plan Able to amb 15 ft w/ Rwx & mod asst,but onset B knee instab,mod SOB, & signif fat.,& unable to attempt 2nd walk after 5 min rest;limited by low HGB (10.8), Nausea,respir.insuff.,decr.endurance       Problem: Inpatient Physical Therapy  Goal: Transfer Training Goal 1 LTG- PT  Outcome: Ongoing (interventions implemented as appropriate)   01/02/18 0953 01/03/18 1522   Transfer Training PT LTG   Transfer Training PT LTG, Date Established 01/02/18 --    Transfer Training PT LTG, Time to Achieve 2 wks --    Transfer Training PT LTG, Activity Type bed to chair /chair to bed;sit to stand/stand to sit --    Transfer Training PT LTG, [place] Level supervision required --    Transfer Training PT LTG, Assist Device walker, rolling --    Transfer Training PT LTG, Outcome --  goal ongoing     Goal: Gait Training Goal LTG- PT  Outcome: Ongoing (interventions implemented as appropriate)   01/02/18 0953 01/03/18 1522   Gait Training PT LTG   Gait Training Goal PT LTG, Date Established 01/02/18 --    Gait Training Goal PT LTG, Time to Achieve 2 wks --    Gait Training Goal PT LTG, [place] Level conditional independence --    Gait Training Goal PT LTG, Assist Device walker, rolling --    Gait Training Goal PT LTG, Distance to Achieve 150 --    Gait Training Goal PT LTG, Outcome --  goal ongoing     Goal: Static Standing Balance Goal LTG- PT  Outcome: Ongoing (interventions implemented as appropriate)   01/02/18 0953 01/03/18 1522   Static Standing Balance PT LTG   Static Standing Balance PT LTG, Date Established 01/02/18 --    Static Standing Balance PT LTG, Time to Achieve 2  wks --    Static Standing Balance PT LTG, [place] Level conditional independence --    Static Standing Balance PT LTG, Assist Device assistive Device --    Static Standing Balance PT LTG, Outcome --  goal ongoing

## 2018-01-05 VITALS
BODY MASS INDEX: 20.88 KG/M2 | HEART RATE: 81 BPM | DIASTOLIC BLOOD PRESSURE: 66 MMHG | HEIGHT: 67 IN | RESPIRATION RATE: 16 BRPM | WEIGHT: 133 LBS | SYSTOLIC BLOOD PRESSURE: 94 MMHG | TEMPERATURE: 97.9 F | OXYGEN SATURATION: 97 %

## 2018-01-05 LAB
ANION GAP SERPL CALCULATED.3IONS-SCNC: 3 MMOL/L (ref 3–11)
BASOPHILS # BLD AUTO: 0.01 10*3/MM3 (ref 0–0.2)
BASOPHILS NFR BLD AUTO: 0.1 % (ref 0–1)
BUN BLD-MCNC: 8 MG/DL (ref 9–23)
BUN/CREAT SERPL: 11.4 (ref 7–25)
CALCIUM SPEC-SCNC: 7.6 MG/DL (ref 8.7–10.4)
CHLORIDE SERPL-SCNC: 92 MMOL/L (ref 99–109)
CO2 SERPL-SCNC: 38 MMOL/L (ref 20–31)
CREAT BLD-MCNC: 0.7 MG/DL (ref 0.6–1.3)
DEPRECATED RDW RBC AUTO: 49.4 FL (ref 37–54)
EOSINOPHIL # BLD AUTO: 0.05 10*3/MM3 (ref 0–0.3)
EOSINOPHIL NFR BLD AUTO: 0.7 % (ref 0–3)
ERYTHROCYTE [DISTWIDTH] IN BLOOD BY AUTOMATED COUNT: 14.3 % (ref 11.3–14.5)
GFR SERPL CREATININE-BSD FRML MDRD: 82 ML/MIN/1.73
GLUCOSE BLD-MCNC: 78 MG/DL (ref 70–100)
HCT VFR BLD AUTO: 29.3 % (ref 34.5–44)
HGB BLD-MCNC: 9.6 G/DL (ref 11.5–15.5)
IMM GRANULOCYTES # BLD: 0.03 10*3/MM3 (ref 0–0.03)
IMM GRANULOCYTES NFR BLD: 0.4 % (ref 0–0.6)
LYMPHOCYTES # BLD AUTO: 0.97 10*3/MM3 (ref 0.6–4.8)
LYMPHOCYTES NFR BLD AUTO: 13.8 % (ref 24–44)
MCH RBC QN AUTO: 31.1 PG (ref 27–31)
MCHC RBC AUTO-ENTMCNC: 32.8 G/DL (ref 32–36)
MCV RBC AUTO: 94.8 FL (ref 80–99)
MONOCYTES # BLD AUTO: 0.77 10*3/MM3 (ref 0–1)
MONOCYTES NFR BLD AUTO: 10.9 % (ref 0–12)
NEUTROPHILS # BLD AUTO: 5.22 10*3/MM3 (ref 1.5–8.3)
NEUTROPHILS NFR BLD AUTO: 74.1 % (ref 41–71)
PLATELET # BLD AUTO: 209 10*3/MM3 (ref 150–450)
PMV BLD AUTO: 8.9 FL (ref 6–12)
POTASSIUM BLD-SCNC: 3 MMOL/L (ref 3.5–5.5)
RBC # BLD AUTO: 3.09 10*6/MM3 (ref 3.89–5.14)
SODIUM BLD-SCNC: 133 MMOL/L (ref 132–146)
WBC NRBC COR # BLD: 7.05 10*3/MM3 (ref 3.5–10.8)

## 2018-01-05 PROCEDURE — 99239 HOSP IP/OBS DSCHRG MGMT >30: CPT | Performed by: NURSE PRACTITIONER

## 2018-01-05 PROCEDURE — 85025 COMPLETE CBC W/AUTO DIFF WBC: CPT | Performed by: PHYSICIAN ASSISTANT

## 2018-01-05 PROCEDURE — 80048 BASIC METABOLIC PNL TOTAL CA: CPT | Performed by: PHYSICIAN ASSISTANT

## 2018-01-05 RX ORDER — IPRATROPIUM BROMIDE AND ALBUTEROL SULFATE 2.5; .5 MG/3ML; MG/3ML
3 SOLUTION RESPIRATORY (INHALATION) EVERY 4 HOURS PRN
Start: 2018-01-05

## 2018-01-05 RX ORDER — POTASSIUM CHLORIDE 1500 MG/1
20 TABLET, FILM COATED, EXTENDED RELEASE ORAL DAILY
Start: 2018-01-05

## 2018-01-05 RX ORDER — ACETAMINOPHEN 325 MG/1
650 TABLET ORAL EVERY 6 HOURS PRN
Start: 2018-01-05

## 2018-01-05 RX ORDER — POTASSIUM CHLORIDE 750 MG/1
40 CAPSULE, EXTENDED RELEASE ORAL AS NEEDED
Status: DISCONTINUED | OUTPATIENT
Start: 2018-01-05 | End: 2018-01-05 | Stop reason: HOSPADM

## 2018-01-05 RX ORDER — FUROSEMIDE 40 MG/1
40 TABLET ORAL DAILY
Start: 2018-01-06

## 2018-01-05 RX ORDER — POTASSIUM CHLORIDE 7.45 MG/ML
10 INJECTION INTRAVENOUS
Status: DISCONTINUED | OUTPATIENT
Start: 2018-01-05 | End: 2018-01-05 | Stop reason: HOSPADM

## 2018-01-05 RX ORDER — POTASSIUM CHLORIDE 1.5 G/1.77G
40 POWDER, FOR SOLUTION ORAL AS NEEDED
Status: DISCONTINUED | OUTPATIENT
Start: 2018-01-05 | End: 2018-01-05 | Stop reason: HOSPADM

## 2018-01-05 RX ADMIN — AMIODARONE HYDROCHLORIDE 200 MG: 200 TABLET ORAL at 10:07

## 2018-01-05 RX ADMIN — Medication 100 MG: at 10:07

## 2018-01-05 RX ADMIN — POTASSIUM CHLORIDE 40 MEQ: 750 CAPSULE, EXTENDED RELEASE ORAL at 10:08

## 2018-01-05 RX ADMIN — ASPIRIN 81 MG 81 MG: 81 TABLET ORAL at 10:07

## 2018-01-05 RX ADMIN — GUAIFENESIN 600 MG: 600 TABLET, EXTENDED RELEASE ORAL at 10:07

## 2018-01-05 RX ADMIN — FUROSEMIDE 40 MG: 40 TABLET ORAL at 10:07

## 2018-01-05 RX ADMIN — PAROXETINE HYDROCHLORIDE 20 MG: 20 TABLET, FILM COATED ORAL at 10:07

## 2018-01-05 RX ADMIN — METOPROLOL TARTRATE 25 MG: 25 TABLET ORAL at 10:07

## 2018-01-05 RX ADMIN — PANTOPRAZOLE SODIUM 40 MG: 40 TABLET, DELAYED RELEASE ORAL at 10:06

## 2018-01-05 RX ADMIN — POTASSIUM CHLORIDE 40 MEQ: 750 CAPSULE, EXTENDED RELEASE ORAL at 15:59

## 2018-01-05 RX ADMIN — FOLIC ACID 1 MG: 1 TABLET ORAL at 10:07

## 2018-01-05 NOTE — PROGRESS NOTES
Continued Stay Note  [facility]     Patient Name: Margarita Davila  MRN: [id number]  Today's Date: 1/5/2018    Admit Date: 12/31/2017          Discharge Plan       01/05/18 1451    Case Management/Social Work Plan    Additional Comments SW was contacted by pt's nurse who reports pt's daughter is here for transportation, however had forgotten pt's portable tank for oxygen. Pt's daughter reports they use [facility] pharmacy in [place]. SW called [phone] and staff reports that the drivers are already out on several deliveries and not sure when able to get the oxygen tank. KAYLA updated pt and pt's daughter. Pt's daughter reports her daughter will bring the portable to [facility] and head here now so pt can be at [facility] by 5:00pm. KAYLA called Neto for [facility] and let them know pt will not be at facility until about 5:00pm.               Discharge Codes     None        Expected Discharge Date and Time     Expected Discharge Date Expected Discharge Time    Jan 5, 2018             LATRICIA Herrera

## 2018-01-05 NOTE — DISCHARGE SUMMARY
[facility] Medicine Services  DISCHARGE SUMMARY    Patient Name: Margarita Davila  : 1945  MRN: [id number]    Date of Admission: 2017  Date of Discharge: 2018  Primary Care Physician: No primary care provider on file.    Consults     Date and Time Order Name Status Description    2018 0614 Inpatient Consult to Pulmonology Completed         Hospital Course     Presenting Problem:   Pleural effusion [J90]  Pleural effusion [J90]    Active Hospital Problems (** Indicates Principal Problem)    Diagnosis Date Noted   • **Recurrent right pleural effusion [J90] 2017   • Atrial fibrillation [I48.91] 2017   • Medical non-compliance [Z91.19] 2017   • History of tobacco abuse [Z87.891] 2017   • History of alcohol abuse [Z87.898] 2017   • End stage COPD [J44.9] 2017   • Supplemental oxygen dependent [Z99.81] 2017   • Recurrent left pleural effusion [J90] 2017   • Debility [R53.81] 2017   • Hyperlipidemia [E78.5] 2017   • Depression with anxiety [F41.8] 2017   • Hypertension [I10] 2017   • Pleural effusion [J90] 2017      Resolved Hospital Problems    Diagnosis Date Noted Date Resolved   No resolved problems to display.          Hospital Course:  Margarita Davila is a 72 y.o. female PMH significant for HTN, HLD, end stage COPD, a-fib, tobacco abuse, and ETOH abuse, that originally presented to [facility] on 2017 with complaint of SOA. She reportedly was in [facility] x 2 over the past month with the most recent admission being 3 weeks ago due to medical noncompliance with her aifb and increased debility. It was felt that she would benefit from inpt rehab, but her insurance denied payment, so she went home with her daughter. The daughter notes that after discharge, she was doing poorly with decreased appetite and weakness as well as progressively short of breath prompting her presentation  to the [facility] ED.   Upon arrival to the OSH, CXR revealed complete opacification of the right hemithorax with possible combination of effusion and atelectasis as well as abrupt cutoff of the right mainstem bronchus with suspicion of centrally obstructing mass. CT chest PE protocol was negative for PE, and confirmed large pleural effusion with complete atelectaic collapse of the right lung with obstruction of the right mainstem bronchus from possible mucus plugging, or endobronchial neoplasm.  She then underwent CT guided thoracentesis on 12/21/17 with successful drainage of 1500 ml of fluid with negative pathology report. She then began to have SOA again, and repeat of the CXR revealed recurrent right pleural effusion.  Decision was made to transfer to [facility] due to inability of the OSH to repeat thoracentesis, and Pulmonolgy consult.  Repeat CXR on arrival to [facility] revealed small to moderate right pleural effusion and small on left.  She was stable on O2 per nasal cannula and pulmonary was consulted.  Please see ECHO results below.  It was felt that the etiology of pt's transudative effusion is likely predominantly due to hypoalbuminemia and with a possible cardiac component given moderate TR and elevated RVSP.  There was no evidence of pneumonia, but her BNP was elevated at 291 and diuresis was added.  Nutritional supplements, thiamine and folic acid were added.  She did not require repeat thoracentesis as were improving bilaterally on CXR.  Significance of her +rheumatoid factor was unclear and should follow up with rheumatologist after dc.  She will follow up with pulm in 1-2 weeks for repeat CXR.  She is stable and ready for transfer to [facility] for continued rehab.  Pt is in agreement with this plan.               Day of Discharge     HPI:   Hospital follow up for pleural effusions    Review of Systems  Gen- No fevers, chills  CV- No chest pain, palpitations  Resp- +cough, dyspnea at  baseline  GI- No N/V/D, abd pain      Otherwise ROS is negative except as mentioned in the HPI.    Vital Signs:   Temp:  [97.9 °F (36.6 °C)-98.5 °F (36.9 °C)] 97.9 °F (36.6 °C)  Heart Rate:  [70-81] 81  Resp:  [16-18] 16  BP: ()/(66-86) 121/86     Physical Exam:  Constitutional: No acute distress, awake, alert  HENT: NCAT, mucous membranes moist  Respiratory: bibasilar crackles o/w clear, coarse cough  Cardiovascular: irreg, afib on tele, no murmurs, rubs, or gallops, palpable pedal pulses bilaterally  Gastrointestinal: Positive bowel sounds, soft, nontender, nondistended  Musculoskeletal: No bilateral ankle edema  Psychiatric: Appropriate affect, cooperative  Neurologic: Oriented x 3, strength symmetric in all extremities, speech clear        Pertinent  and/or Most Recent Results       Results from last 7 days  Lab Units 01/05/18  0517 01/02/18  0800 01/01/18  0548 12/31/17  2225   WBC 10*3/mm3 7.05 8.01 8.50 9.52   HEMOGLOBIN g/dL 9.6* 10.8* 9.8* 9.9*   HEMATOCRIT % 29.3* 33.1* 29.6* 30.1*   PLATELETS 10*3/mm3 209 287 294 286   SODIUM mmol/L 133 137 135 135   POTASSIUM mmol/L 3.0* 5.1 4.4 4.0   CHLORIDE mmol/L 92* 98* 95* 96*   CO2 mmol/L 38.0* 35.0* 32.0* 30.0   BUN mg/dL 8* 9 14 15   CREATININE mg/dL 0.70 0.70 0.70 0.70   GLUCOSE mg/dL 78 78 79 107*   CALCIUM mg/dL 7.6* 8.2* 8.1* 7.9*       Results from last 7 days  Lab Units 01/02/18  0800 01/01/18  0548 12/31/17  2225   BILIRUBIN mg/dL 0.5  --  0.2*   ALK PHOS U/L 69  --  85   ALT (SGPT) U/L 43*  --  38   AST (SGOT) U/L 41*  --  28   PROTIME Seconds  --  10.2  --    INR   --  0.94  --        Results from last 7 days  Lab Units 01/02/18  0800 01/01/18  0010   TSH mIU/mL  --  0.774   BNP pg/mL 291.0*  --      Brief Urine Lab Results     None          Microbiology Results Abnormal     None          Imaging Results (all)     Procedure Component Value Units Date/Time    XR Chest PA & Lateral [[id number]] Collected:  12/31/17 2330     Updated:  01/01/18 0200     Narrative:       EXAM:    XR Chest, 2 Views    CLINICAL HISTORY:    72 years old, female; Signs and symptoms; Other: Pleural effusion    TECHNIQUE:    Frontal and lateral views of the chest.    COMPARISON:    No relevant prior studies available.    FINDINGS:    Lungs:  Hyperinflated.  Moderate right and small-to-moderate left pleural   effusions with underlying pulmonary opacities.    Pleural space:  See above.    Heart:  Unremarkable.    Mediastinum:  Unremarkable.    Bones/joints:  Unremarkable.      Impression:         Moderate right and small-to-moderate left pleural effusions with underlying   atelectasis plus or minus infiltrate.    THIS DOCUMENT HAS BEEN ELECTRONICALLY SIGNED BY HECTOR STACY MD     Liver [[id number]] Collected:  01/02/18 0936     Updated:  01/02/18 1153    Narrative:       EXAMINATION: US LIVER-01/02/2018:     INDICATION: Evaluate for chronic liver disease.         TECHNIQUE: Ultrasound of the right upper quadrant was performed in the  longitudinal and transverse planes.     COMPARISON: NONE.     FINDINGS: The pancreas is normal. The liver reveals no focal abnormality  or biliary ductal dilatation. The gallbladder demonstrates no wall  thickening or stones. The common bile duct measures 6 mm in AP dimension  with no evidence of common duct stone. The right kidney measures 10.1 cm  in length with no mass, stone or obstruction. There is a right pleural  effusion.       Impression:       Normal examination. There are no abnormality regarding the  pancreas, liver, gallbladder or right kidney.     D:  01/02/2018  E:  01/02/2018            This report was finalized on 1/2/2018 11:51 AM by Dr. Bertin Church MD.       XR Chest PA & Lateral [[id number]] Collected:  01/03/18 1129     Updated:  01/03/18 1309    Narrative:       EXAMINATION: XR CHEST, PA AND LATERAL-01/03/2018:      INDICATION: Followup pleffs; Z74.09-Other reduced mobility; Z74.09-Other  reduced mobility.      COMPARISON: Chest x-ray  01/01/2018.     FINDINGS: Cardiac size enlarged with trace pulmonary vascular  congestion. Persistent lateral pleural effusions, right greater than  left, with mild decrease in prominence and improved aeration of the  right lung base which may represent some component of layering. No new  focal airspace opacity.        Impression:       Slightly improved aeration of the right lung base may  represent increased layering component of bilateral pleural effusions  which are similar to prior. No new parenchymal disease.     D:  01/03/2018  E:  01/03/2018     This report was finalized on 1/3/2018 1:07 PM by Dr. Gil Tran.       XR Chest 1 View [[id number]] Collected:  01/04/18 0845     Updated:  01/04/18 2314    Narrative:       EXAMINATION: XR CHEST 1 VW-      INDICATION: Pleural effusion; Z74.09-Other reduced mobility.      COMPARISON: 01/03/2018.     FINDINGS: Right basilar effusion appears mildly improved. There is now  only minimal left effusion. Mild interstitial changes elsewhere.  The  lungs appear stable.  Heart is upper normal size. Vasculature appears  normal.        Impression:       Improving pleural effusions is noted.     D:  01/04/2018  E:  01/04/2018     This report was finalized on 1/4/2018 11:12 PM by DR. Herbert Valadez MD.             Results for orders placed during the hospital encounter of 12/31/17   Adult Transthoracic Echo Complete W/ Cont if Necessary Per Protocol    Narrative · Left ventricular systolic function is normal. Estimated EF = 55%.  · Left atrial cavity size is borderline dilated.  · Right atrial cavity size is dilated.  · Mild mitral valve regurgitation is present  · Moderate tricuspid valve regurgitation is present.  · Estimated right ventricular systolic pressure from tricuspid   regurgitation is moderately elevated (45-55 mmHg).            Discharge Details      Margarita Davila Medication Instructions ALLA:[id number]    Printed on:01/05/18 1023   Medication Information                       acetaminophen (TYLENOL) 325 MG tablet  Take 2 tablets by mouth Every 6 (Six) Hours As Needed for Mild Pain .             amiodarone (PACERONE) 200 MG tablet  Take 200 mg by mouth 2 (Two) Times a Day.             aspirin 81 MG chewable tablet  Chew 81 mg Daily.             atorvastatin (LIPITOR) 20 MG tablet  Take 20 mg by mouth Every Night.             digoxin (LANOXIN) 125 MCG tablet  Take 125 mcg by mouth 4 (Four) Times a Week. Saturday, Monday, Wednesday, Thursday             folic acid (FOLVITE) 1 MG tablet  Take 1 mg by mouth Daily.             furosemide (LASIX) 40 MG tablet  Take 1 tablet by mouth Daily.             guaiFENesin (MUCINEX) 600 MG 12 hr tablet  Take 600 mg by mouth 2 (Two) Times a Day.             ipratropium-albuterol (DUO-NEB) 0.5-2.5 mg/mL nebulizer  Take 3 mL by nebulization Every 4 (Four) Hours As Needed for Shortness of Air.             melatonin 5 MG sublingual tablet sublingual tablet  Place  under the tongue At Night As Needed.             metoprolol tartrate (LOPRESSOR) 25 MG tablet  Take 25 mg by mouth 2 (Two) Times a Day.             pantoprazole (PROTONIX) 40 MG EC tablet  Take 40 mg by mouth 2 (Two) Times a Day.             PARoxetine (PAXIL) 20 MG tablet  Take 20 mg by mouth Every Morning.             potassium chloride ER (K-TAB) 20 MEQ tablet controlled-release ER tablet  Take 1 tablet by mouth Daily.             thiamine (VITAMIN B-1) 100 MG tablet  Take 100 mg by mouth Daily.                   Discharge Disposition:  Rehab Facility or Unit (DC - External)    Discharge Diet:  Diet Instructions     Diet: Regular       Discharge Diet:  Regular                 Discharge Activity:   Activity Instructions     Activity as Tolerated                       No future appointments.    Additional Instructions for the Follow-ups that You Need to Schedule     Discharge Follow-up with PCP    As directed    Follow Up Details:  after dc from rehab           Discharge Follow-up with  Specialty: Pulmonary clinic    As directed    Specialty:  Pulmonary clinic    Follow Up Details:  1-2 weeks with repeat CXR           Discharge Follow-up with Specialty: rheumatology    As directed    Specialty:  rheumatology    Follow Up Details:  4-6 weeks, ?significance of +rhematoid factor           Basic Metabolic Panel    Jan 08, 2018 (Approximate)    Recheck potassium.   Results to PCP   Order Comments:  Recheck potassium. Results to PCP                      Time Spent on Discharge: 40 minutes     LULU Escudero  01/05/18  10:23 AM

## 2018-01-05 NOTE — PROGRESS NOTES
"Adult Nutrition  Assessment/PES    Patient Name:  Margarita Davlia  YOB: 1945  MRN: [id number]  Admit Date:  12/31/2017    Assessment Date:  1/5/2018    Comments:            Reason for Assessment       01/05/18 1103    Reason for Assessment    Pulmonary/Critical Care --   R pleural effusion      01/05/18 1102    Reason for Assessment    Reason For Assessment/Visit length of stay    Diagnosis --   complete list per MD notes this adm    Pulmonary/Critical Care COPD--end stage                Anthropometrics       01/05/18 1105    Anthropometrics    Height 170.2 cm (67\")    Weight 60.3 kg (133 lb)   per nsg documentation on 1/5    Ideal Body Weight (IBW)    Ideal Body Weight (IBW), Female 62.26    % Ideal Body Weight 97.1    Body Mass Index (BMI)    BMI (kg/m2) 20.87                  Nutrition Prescription Ordered       01/05/18 1105    Nutrition Prescription PO    Current PO Diet Regular            Evaluation of Received Nutrient/Fluid Intake       01/05/18 1105    PO Evaluation    Number of Meals 5    % PO Intake 75            Problem/Interventions:        Problem 1       01/05/18 1106    Nutrition Diagnoses Problem 1    Problem 1 Nutrition Appropriate for Condition at this Time    Etiology (related to) MNT for Treatment/Condition    Signs/Symptoms (evidenced by) PO Intake    Percent (%) intake recorded 75 %    Over number of meals 5                    Intervention Goal       01/05/18 1106    Intervention Goal    PO Maintain intake            Nutrition Intervention       01/05/18 1106    Nutrition Intervention    RD/Tech Action Follow Tx progress              Education/Evaluation       01/05/18 1106    Monitor/Evaluation    Monitor Per protocol        Electronically signed by:  Charlette Hubbard, MS,RD,LD  01/05/18 11:06 AM  "

## 2018-01-05 NOTE — DISCHARGE INSTRUCTIONS
Not able to schedule appointment for rheumatologist at this time.  [facility]  Facility and family was notified regarding appointment .

## 2018-01-05 NOTE — PROGRESS NOTES
Continued Stay Note   Lb     Patient Name: Margarita Davila  MRN: [id number]  Today's Date: 1/5/2018    Admit Date: 12/31/2017          Discharge Plan       01/05/18 1132    Case Management/Social Work Plan    Plan [facility].     Patient/Family In Agreement With Plan yes    Additional Comments Pt has discharge orders in. KAYLA called and spoke with [facility] Dillanjoss YouBeryl and let her know pt is medically ready for discharge to [facility] today. KAYLA called pt's daughter Margarita, who reports she will be at [facility] around 2:00pm to take pt to [facility]. KAYLA faxed discharge summary. Number to call report is [phone].    Final Note    Final Note KAYLA is following              Discharge Codes       01/05/18 1100    Discharge Codes    Discharge Codes 03  Discharged/transferred to skilled nursing facility (SNF) with Medicare certification in anticipation of skilled care        Expected Discharge Date and Time     Expected Discharge Date Expected Discharge Time    Jan 5, 2018             LATRICIA Herrera

## 2018-01-05 NOTE — DISCHARGE PLACEMENT REQUEST
"Aneudy Bullock (72 y.o. Female)     Date of Birth Social Security Number Address Home Phone MRN    1945  [address] [place] [address] [phone] [id number]    [employer]eterian Marital Status          None        Admission Date Admission Type Admitting Provider Attending Provider Department, Room/Bed    17 Elective Musa Carson MD Repass, Gregory L, MD [facility] 6B, N624/1    Discharge Date Discharge Disposition Discharge Destination         Rehab Facility or Unit (DC - External)             Attending Provider: Musa Carson MD     Allergies:  Penicillins    Isolation:  None   Infection:  None   Code Status:  FULL    Ht:  170.2 cm (67\")   Wt:  60.7 kg (133 lb 12.8 oz)    Admission Cmt:  None   Principal Problem:  Recurrent right pleural effusion [J90]                 Active Insurance as of 2017     Primary Coverage     Payor Plan Insurance Group Employer/Plan Group    MISC MEDICARE REPLACMENT [facility]      Coverage Address Coverage Phone Number Effective From Effective To    [address] [phone] 2017     [place] [id number]       Subscriber Name Subscriber Birth Date Member ID       ANEUDY BULLOCK 1945 [id number]                 Emergency Contacts      (Rel.) Home Phone Work Phone Mobile Phone    Aneudy Torres (Daughter) -- -- [phone]               Discharge Summary      Aneudy RuffinLULU at 2018  9:50 AM              [facility] Medicine Services  DISCHARGE SUMMARY    Patient Name: Aneudy Bullock  : 1945  MRN: [id number]    Date of Admission: 2017  Date of Discharge: 2018  Primary Care Physician: No primary care provider on file.    Consults     Date and Time Order Name Status Description    2018 0614 Inpatient Consult to Pulmonology Completed         Hospital Course     Presenting Problem:   Pleural effusion [J90]  Pleural effusion [J90]    Active Hospital Problems (** Indicates " Principal Problem)    Diagnosis Date Noted   • **Recurrent right pleural effusion [J90] 12/31/2017   • Atrial fibrillation [I48.91] 12/31/2017   • Medical non-compliance [Z91.19] 12/31/2017   • History of tobacco abuse [Z87.891] 12/31/2017   • History of alcohol abuse [Z87.898] 12/31/2017   • End stage COPD [J44.9] 12/31/2017   • Supplemental oxygen dependent [Z99.81] 12/31/2017   • Recurrent left pleural effusion [J90] 12/31/2017   • Debility [R53.81] 12/31/2017   • Hyperlipidemia [E78.5] 12/31/2017   • Depression with anxiety [F41.8] 12/31/2017   • Hypertension [I10] 12/31/2017   • Pleural effusion [J90] 12/31/2017      Resolved Hospital Problems    Diagnosis Date Noted Date Resolved   No resolved problems to display.          Hospital Course:  Margarita Davila is a 72 y.o. female PMH significant for HTN, HLD, end stage COPD, a-fib, tobacco abuse, and ETOH abuse, that originally presented to [facility] on 12/19/2017 with complaint of SOA. She reportedly was in [facility] x 2 over the past month with the most recent admission being 3 weeks ago due to medical noncompliance with her aifb and increased debility. It was felt that she would benefit from inpt rehab, but her insurance denied payment, so she went home with her daughter. The daughter notes that after discharge, she was doing poorly with decreased appetite and weakness as well as progressively short of breath prompting her presentation to the [facility] ED.   Upon arrival to the OSH, CXR revealed complete opacification of the right hemithorax with possible combination of effusion and atelectasis as well as abrupt cutoff of the right mainstem bronchus with suspicion of centrally obstructing mass. CT chest PE protocol was negative for PE, and confirmed large pleural effusion with complete atelectaic collapse of the right lung with obstruction of the right mainstem bronchus from possible mucus plugging, or endobronchial neoplasm.  She then  underwent CT guided thoracentesis on 12/21/17 with successful drainage of 1500 ml of fluid with negative pathology report. She then began to have SOA again, and repeat of the CXR revealed recurrent right pleural effusion.  Decision was made to transfer to [facility] due to inability of the OSH to repeat thoracentesis, and Pulmonolgy consult.   Repeat CXR on arrival to [facility] revealed small to moderate right pleural effusion and small on left.  She was stable on O2 per nasal cannula and pulmonary was consulted.  Please see ECHO results below.  It was felt that the etiology of pt's transudative effusion is likely predominantly due to hypoalbuminemia and with a possible cardiac component given moderate TR and elevated RVSP.  There was no evidence of pneumonia, but her BNP was elevated at 291 and diuresis was added.  Nutritional supplements, thiamine and folic acid were added.  She did not require repeat thoracentesis as were improving bilaterally on CXR.  Significance of her +rheumatoid factor was unclear and should follow up with rheumatologist after dc.  She will follow up with pulm in 1-2 weeks for repeat CXR.  She is stable and ready for transfer to [facility] for continued rehab.  Pt is in agreement with this plan.               Day of Discharge     HPI:   Hospital follow up for pleural effusions    Review of Systems  Gen- No fevers, chills  CV- No chest pain, palpitations  Resp- +cough, dyspnea at baseline  GI- No N/V/D, abd pain      Otherwise ROS is negative except as mentioned in the HPI.    Vital Signs:   Temp:  [97.9 °F (36.6 °C)-98.5 °F (36.9 °C)] 97.9 °F (36.6 °C)  Heart Rate:  [70-81] 81  Resp:  [16-18] 16  BP: ()/(66-86) 121/86     Physical Exam:  Constitutional: No acute distress, awake, alert  HENT: NCAT, mucous membranes moist  Respiratory: bibasilar crackles o/w clear, coarse cough  Cardiovascular: irreg, afib on tele, no murmurs, rubs, or gallops, palpable pedal pulses  bilaterally  Gastrointestinal: Positive bowel sounds, soft, nontender, nondistended  Musculoskeletal: No bilateral ankle edema  Psychiatric: Appropriate affect, cooperative  Neurologic: Oriented x 3, strength symmetric in all extremities, speech clear        Pertinent  and/or Most Recent Results       Results from last 7 days  Lab Units 01/05/18  0517 01/02/18  0800 01/01/18  0548 12/31/17  2225   WBC 10*3/mm3 7.05 8.01 8.50 9.52   HEMOGLOBIN g/dL 9.6* 10.8* 9.8* 9.9*   HEMATOCRIT % 29.3* 33.1* 29.6* 30.1*   PLATELETS 10*3/mm3 209 287 294 286   SODIUM mmol/L 133 137 135 135   POTASSIUM mmol/L 3.0* 5.1 4.4 4.0   CHLORIDE mmol/L 92* 98* 95* 96*   CO2 mmol/L 38.0* 35.0* 32.0* 30.0   BUN mg/dL 8* 9 14 15   CREATININE mg/dL 0.70 0.70 0.70 0.70   GLUCOSE mg/dL 78 78 79 107*   CALCIUM mg/dL 7.6* 8.2* 8.1* 7.9*       Results from last 7 days  Lab Units 01/02/18  0800 01/01/18  0548 12/31/17  2225   BILIRUBIN mg/dL 0.5  --  0.2*   ALK PHOS U/L 69  --  85   ALT (SGPT) U/L 43*  --  38   AST (SGOT) U/L 41*  --  28   PROTIME Seconds  --  10.2  --    INR   --  0.94  --        Results from last 7 days  Lab Units 01/02/18  0800 01/01/18  0010   TSH mIU/mL  --  0.774   BNP pg/mL 291.0*  --      Brief Urine Lab Results     None          Microbiology Results Abnormal     None          Imaging Results (all)     Procedure Component Value Units Date/Time    XR Chest PA & Lateral [[id number]] Collected:  12/31/17 2330     Updated:  01/01/18 0200    Narrative:       EXAM:    XR Chest, 2 Views    CLINICAL HISTORY:    72 years old, female; Signs and symptoms; Other: Pleural effusion    TECHNIQUE:    Frontal and lateral views of the chest.    COMPARISON:    No relevant prior studies available.    FINDINGS:    Lungs:  Hyperinflated.  Moderate right and small-to-moderate left pleural   effusions with underlying pulmonary opacities.    Pleural space:  See above.    Heart:  Unremarkable.    Mediastinum:  Unremarkable.    Bones/joints:   Unremarkable.      Impression:         Moderate right and small-to-moderate left pleural effusions with underlying   atelectasis plus or minus infiltrate.    THIS DOCUMENT HAS BEEN ELECTRONICALLY SIGNED BY HECTOR STACY MD     Liver [[id number]] Collected:  01/02/18 0936     Updated:  01/02/18 1153    Narrative:       EXAMINATION: US LIVER-01/02/2018:     INDICATION: Evaluate for chronic liver disease.         TECHNIQUE: Ultrasound of the right upper quadrant was performed in the  longitudinal and transverse planes.     COMPARISON: NONE.     FINDINGS: The pancreas is normal. The liver reveals no focal abnormality  or biliary ductal dilatation. The gallbladder demonstrates no wall  thickening or stones. The common bile duct measures 6 mm in AP dimension  with no evidence of common duct stone. The right kidney measures 10.1 cm  in length with no mass, stone or obstruction. There is a right pleural  effusion.       Impression:       Normal examination. There are no abnormality regarding the  pancreas, liver, gallbladder or right kidney.     D:  01/02/2018  E:  01/02/2018            This report was finalized on 1/2/2018 11:51 AM by Dr. Bertin Church MD.       XR Chest PA & Lateral [[id number]] Collected:  01/03/18 1129     Updated:  01/03/18 1309    Narrative:       EXAMINATION: XR CHEST, PA AND LATERAL-01/03/2018:      INDICATION: Followup pleffs; Z74.09-Other reduced mobility; Z74.09-Other  reduced mobility.      COMPARISON: Chest x-ray 01/01/2018.     FINDINGS: Cardiac size enlarged with trace pulmonary vascular  congestion. Persistent lateral pleural effusions, right greater than  left, with mild decrease in prominence and improved aeration of the  right lung base which may represent some component of layering. No new  focal airspace opacity.        Impression:       Slightly improved aeration of the right lung base may  represent increased layering component of bilateral pleural effusions  which are similar to  prior. No new parenchymal disease.     D:  01/03/2018  E:  01/03/2018     This report was finalized on 1/3/2018 1:07 PM by Dr. Gil Tran.       XR Chest 1 View [[id number]] Collected:  01/04/18 0845     Updated:  01/04/18 2314    Narrative:       EXAMINATION: XR CHEST 1 VW-      INDICATION: Pleural effusion; Z74.09-Other reduced mobility.      COMPARISON: 01/03/2018.     FINDINGS: Right basilar effusion appears mildly improved. There is now  only minimal left effusion. Mild interstitial changes elsewhere.  The  lungs appear stable.  Heart is upper normal size. Vasculature appears  normal.        Impression:       Improving pleural effusions is noted.     D:  01/04/2018  E:  01/04/2018     This report was finalized on 1/4/2018 11:12 PM by DR. Herbert Valadez MD.             Results for orders placed during the hospital encounter of 12/31/17   Adult Transthoracic Echo Complete W/ Cont if Necessary Per Protocol    Narrative · Left ventricular systolic function is normal. Estimated EF = 55%.  · Left atrial cavity size is borderline dilated.  · Right atrial cavity size is dilated.  · Mild mitral valve regurgitation is present  · Moderate tricuspid valve regurgitation is present.  · Estimated right ventricular systolic pressure from tricuspid   regurgitation is moderately elevated (45-55 mmHg).            Discharge Details      Margarita Davila   Home Medication Instructions ALLA:[id number]    Printed on:01/05/18 1023   Medication Information                      acetaminophen (TYLENOL) 325 MG tablet  Take 2 tablets by mouth Every 6 (Six) Hours As Needed for Mild Pain .             amiodarone (PACERONE) 200 MG tablet  Take 200 mg by mouth 2 (Two) Times a Day.             aspirin 81 MG chewable tablet  Chew 81 mg Daily.             atorvastatin (LIPITOR) 20 MG tablet  Take 20 mg by mouth Every Night.             digoxin (LANOXIN) 125 MCG tablet  Take 125 mcg by mouth 4 (Four) Times a Week. Saturday, Monday, Wednesday, Thursday              folic acid (FOLVITE) 1 MG tablet  Take 1 mg by mouth Daily.             furosemide (LASIX) 40 MG tablet  Take 1 tablet by mouth Daily.             guaiFENesin (MUCINEX) 600 MG 12 hr tablet  Take 600 mg by mouth 2 (Two) Times a Day.             ipratropium-albuterol (DUO-NEB) 0.5-2.5 mg/mL nebulizer  Take 3 mL by nebulization Every 4 (Four) Hours As Needed for Shortness of Air.             melatonin 5 MG sublingual tablet sublingual tablet  Place  under the tongue At Night As Needed.             metoprolol tartrate (LOPRESSOR) 25 MG tablet  Take 25 mg by mouth 2 (Two) Times a Day.             pantoprazole (PROTONIX) 40 MG EC tablet  Take 40 mg by mouth 2 (Two) Times a Day.             PARoxetine (PAXIL) 20 MG tablet  Take 20 mg by mouth Every Morning.             potassium chloride ER (K-TAB) 20 MEQ tablet controlled-release ER tablet  Take 1 tablet by mouth Daily.             thiamine (VITAMIN B-1) 100 MG tablet  Take 100 mg by mouth Daily.                   Discharge Disposition:  Rehab Facility or Unit (DC - External)    Discharge Diet:  Diet Instructions     Diet: Regular       Discharge Diet:  Regular                 Discharge Activity:   Activity Instructions     Activity as Tolerated                       No future appointments.    Additional Instructions for the Follow-ups that You Need to Schedule     Discharge Follow-up with PCP    As directed    Follow Up Details:  after dc from rehab           Discharge Follow-up with Specialty: Pulmonary clinic    As directed    Specialty:  Pulmonary clinic    Follow Up Details:  1-2 weeks with repeat CXR           Discharge Follow-up with Specialty: rheumatology    As directed    Specialty:  rheumatology    Follow Up Details:  4-6 weeks, ?significance of +rhematoid factor           Basic Metabolic Panel    Jan 08, 2018 (Approximate)    Recheck potassium.   Results to PCP   Order Comments:  Recheck potassium. Results to PCP                      Time Spent on  Discharge: 40 minutes     LULU Escudero  01/05/18  10:23 AM         Electronically signed by LULU Escudero at 1/5/2018 10:56 AM

## 2018-01-08 ENCOUNTER — RESULTS ENCOUNTER (OUTPATIENT)
Dept: TELEMETRY | Facility: HOSPITAL | Age: 73
End: 2018-01-08

## 2018-01-08 DIAGNOSIS — E87.6 HYPOKALEMIA: ICD-10-CM
